# Patient Record
Sex: FEMALE | Race: BLACK OR AFRICAN AMERICAN | NOT HISPANIC OR LATINO
[De-identification: names, ages, dates, MRNs, and addresses within clinical notes are randomized per-mention and may not be internally consistent; named-entity substitution may affect disease eponyms.]

---

## 2019-09-19 ENCOUNTER — RESULT REVIEW (OUTPATIENT)
Age: 24
End: 2019-09-19

## 2019-11-25 ENCOUNTER — LABORATORY RESULT (OUTPATIENT)
Age: 24
End: 2019-11-25

## 2019-11-26 ENCOUNTER — APPOINTMENT (OUTPATIENT)
Dept: ANTEPARTUM | Facility: CLINIC | Age: 24
End: 2019-11-26
Payer: COMMERCIAL

## 2019-11-26 PROCEDURE — 76813 OB US NUCHAL MEAS 1 GEST: CPT

## 2019-11-26 PROCEDURE — 76801 OB US < 14 WKS SINGLE FETUS: CPT

## 2019-12-02 PROBLEM — Z00.00 ENCOUNTER FOR PREVENTIVE HEALTH EXAMINATION: Status: ACTIVE | Noted: 2019-12-02

## 2019-12-13 ENCOUNTER — APPOINTMENT (OUTPATIENT)
Dept: ANTEPARTUM | Facility: CLINIC | Age: 24
End: 2019-12-13

## 2020-01-02 ENCOUNTER — APPOINTMENT (OUTPATIENT)
Dept: ANTEPARTUM | Facility: CLINIC | Age: 25
End: 2020-01-02

## 2020-02-03 ENCOUNTER — APPOINTMENT (OUTPATIENT)
Dept: ANTEPARTUM | Facility: CLINIC | Age: 25
End: 2020-02-03
Payer: MEDICAID

## 2020-02-03 PROCEDURE — 76811 OB US DETAILED SNGL FETUS: CPT

## 2020-02-03 PROCEDURE — 76817 TRANSVAGINAL US OBSTETRIC: CPT

## 2020-02-10 ENCOUNTER — APPOINTMENT (OUTPATIENT)
Dept: ANTEPARTUM | Facility: CLINIC | Age: 25
End: 2020-02-10
Payer: MEDICAID

## 2020-02-10 PROCEDURE — 76816 OB US FOLLOW-UP PER FETUS: CPT

## 2020-02-10 PROCEDURE — 76817 TRANSVAGINAL US OBSTETRIC: CPT

## 2020-02-11 ENCOUNTER — APPOINTMENT (OUTPATIENT)
Dept: ANTEPARTUM | Facility: CLINIC | Age: 25
End: 2020-02-11

## 2020-03-02 ENCOUNTER — APPOINTMENT (OUTPATIENT)
Dept: ANTEPARTUM | Facility: CLINIC | Age: 25
End: 2020-03-02
Payer: MEDICAID

## 2020-03-02 PROCEDURE — 76819 FETAL BIOPHYS PROFIL W/O NST: CPT

## 2020-03-02 PROCEDURE — 76816 OB US FOLLOW-UP PER FETUS: CPT

## 2020-03-02 PROCEDURE — 76817 TRANSVAGINAL US OBSTETRIC: CPT

## 2020-03-30 ENCOUNTER — APPOINTMENT (OUTPATIENT)
Dept: ANTEPARTUM | Facility: CLINIC | Age: 25
End: 2020-03-30
Payer: MEDICAID

## 2020-03-30 PROCEDURE — 76817 TRANSVAGINAL US OBSTETRIC: CPT

## 2020-03-30 PROCEDURE — 76816 OB US FOLLOW-UP PER FETUS: CPT

## 2020-03-30 PROCEDURE — 76819 FETAL BIOPHYS PROFIL W/O NST: CPT

## 2020-04-09 ENCOUNTER — INPATIENT (INPATIENT)
Facility: HOSPITAL | Age: 25
LOS: 4 days | Discharge: HOME | End: 2020-04-14
Attending: SPECIALIST | Admitting: SPECIALIST
Payer: MEDICAID

## 2020-04-09 VITALS
SYSTOLIC BLOOD PRESSURE: 136 MMHG | OXYGEN SATURATION: 100 % | RESPIRATION RATE: 19 BRPM | TEMPERATURE: 98 F | DIASTOLIC BLOOD PRESSURE: 70 MMHG | HEART RATE: 149 BPM

## 2020-04-09 DIAGNOSIS — Z98.890 OTHER SPECIFIED POSTPROCEDURAL STATES: Chronic | ICD-10-CM

## 2020-04-09 DIAGNOSIS — R06.02 SHORTNESS OF BREATH: ICD-10-CM

## 2020-04-09 DIAGNOSIS — R09.02 HYPOXEMIA: ICD-10-CM

## 2020-04-09 LAB
ALBUMIN SERPL ELPH-MCNC: 3.5 G/DL — SIGNIFICANT CHANGE UP (ref 3.3–5)
ALP SERPL-CCNC: 125 U/L — HIGH (ref 40–120)
ALT FLD-CCNC: 49 U/L — HIGH (ref 4–33)
ANION GAP SERPL CALC-SCNC: 14 MMO/L — SIGNIFICANT CHANGE UP (ref 7–14)
AST SERPL-CCNC: 81 U/L — HIGH (ref 4–32)
BASOPHILS # BLD AUTO: 0.04 K/UL — SIGNIFICANT CHANGE UP (ref 0–0.2)
BASOPHILS NFR BLD AUTO: 0.5 % — SIGNIFICANT CHANGE UP (ref 0–2)
BILIRUB SERPL-MCNC: 0.4 MG/DL — SIGNIFICANT CHANGE UP (ref 0.2–1.2)
BUN SERPL-MCNC: 6 MG/DL — LOW (ref 7–23)
CALCIUM SERPL-MCNC: 8.4 MG/DL — SIGNIFICANT CHANGE UP (ref 8.4–10.5)
CHLORIDE SERPL-SCNC: 99 MMOL/L — SIGNIFICANT CHANGE UP (ref 98–107)
CO2 SERPL-SCNC: 19 MMOL/L — LOW (ref 22–31)
CREAT SERPL-MCNC: 0.64 MG/DL — SIGNIFICANT CHANGE UP (ref 0.5–1.3)
EOSINOPHIL # BLD AUTO: 0 K/UL — SIGNIFICANT CHANGE UP (ref 0–0.5)
EOSINOPHIL NFR BLD AUTO: 0 % — SIGNIFICANT CHANGE UP (ref 0–6)
GLUCOSE SERPL-MCNC: 83 MG/DL — SIGNIFICANT CHANGE UP (ref 70–99)
HCT VFR BLD CALC: 38.6 % — SIGNIFICANT CHANGE UP (ref 34.5–45)
HGB BLD-MCNC: 13 G/DL — SIGNIFICANT CHANGE UP (ref 11.5–15.5)
IMM GRANULOCYTES NFR BLD AUTO: 4 % — HIGH (ref 0–1.5)
LYMPHOCYTES # BLD AUTO: 1.32 K/UL — SIGNIFICANT CHANGE UP (ref 1–3.3)
LYMPHOCYTES # BLD AUTO: 15.5 % — SIGNIFICANT CHANGE UP (ref 13–44)
MCHC RBC-ENTMCNC: 31.3 PG — SIGNIFICANT CHANGE UP (ref 27–34)
MCHC RBC-ENTMCNC: 33.7 % — SIGNIFICANT CHANGE UP (ref 32–36)
MCV RBC AUTO: 93 FL — SIGNIFICANT CHANGE UP (ref 80–100)
MONOCYTES # BLD AUTO: 0.57 K/UL — SIGNIFICANT CHANGE UP (ref 0–0.9)
MONOCYTES NFR BLD AUTO: 6.7 % — SIGNIFICANT CHANGE UP (ref 2–14)
NEUTROPHILS # BLD AUTO: 6.22 K/UL — SIGNIFICANT CHANGE UP (ref 1.8–7.4)
NEUTROPHILS NFR BLD AUTO: 73.3 % — SIGNIFICANT CHANGE UP (ref 43–77)
NRBC # FLD: 0 K/UL — SIGNIFICANT CHANGE UP (ref 0–0)
PLATELET # BLD AUTO: 119 K/UL — LOW (ref 150–400)
PMV BLD: 11.2 FL — SIGNIFICANT CHANGE UP (ref 7–13)
POTASSIUM SERPL-MCNC: 4 MMOL/L — SIGNIFICANT CHANGE UP (ref 3.5–5.3)
POTASSIUM SERPL-SCNC: 4 MMOL/L — SIGNIFICANT CHANGE UP (ref 3.5–5.3)
PROT SERPL-MCNC: 6.4 G/DL — SIGNIFICANT CHANGE UP (ref 6–8.3)
RBC # BLD: 4.15 M/UL — SIGNIFICANT CHANGE UP (ref 3.8–5.2)
RBC # FLD: 12.5 % — SIGNIFICANT CHANGE UP (ref 10.3–14.5)
SODIUM SERPL-SCNC: 132 MMOL/L — LOW (ref 135–145)
TROPONIN T, HIGH SENSITIVITY: < 6 NG/L — SIGNIFICANT CHANGE UP (ref ?–14)
TSH SERPL-MCNC: 0.66 UIU/ML — SIGNIFICANT CHANGE UP (ref 0.27–4.2)
WBC # BLD: 8.49 K/UL — SIGNIFICANT CHANGE UP (ref 3.8–10.5)
WBC # FLD AUTO: 8.49 K/UL — SIGNIFICANT CHANGE UP (ref 3.8–10.5)

## 2020-04-09 PROCEDURE — 71275 CT ANGIOGRAPHY CHEST: CPT | Mod: 26

## 2020-04-09 RX ORDER — HEPARIN SODIUM 5000 [USP'U]/ML
5000 INJECTION INTRAVENOUS; SUBCUTANEOUS EVERY 12 HOURS
Refills: 0 | Status: DISCONTINUED | OUTPATIENT
Start: 2020-04-09 | End: 2020-04-14

## 2020-04-09 RX ORDER — PROGESTERONE 200 MG/1
200 CAPSULE, LIQUID FILLED ORAL DAILY
Refills: 0 | Status: DISCONTINUED | OUTPATIENT
Start: 2020-04-09 | End: 2020-04-14

## 2020-04-09 RX ORDER — FOLIC ACID 0.8 MG
1 TABLET ORAL DAILY
Refills: 0 | Status: DISCONTINUED | OUTPATIENT
Start: 2020-04-09 | End: 2020-04-10

## 2020-04-09 RX ORDER — SODIUM CHLORIDE 9 MG/ML
1000 INJECTION INTRAMUSCULAR; INTRAVENOUS; SUBCUTANEOUS ONCE
Refills: 0 | Status: COMPLETED | OUTPATIENT
Start: 2020-04-09 | End: 2020-04-09

## 2020-04-09 RX ORDER — ACETAMINOPHEN 500 MG
650 TABLET ORAL ONCE
Refills: 0 | Status: COMPLETED | OUTPATIENT
Start: 2020-04-09 | End: 2020-04-09

## 2020-04-09 RX ADMIN — SODIUM CHLORIDE 1000 MILLILITER(S): 9 INJECTION INTRAMUSCULAR; INTRAVENOUS; SUBCUTANEOUS at 16:55

## 2020-04-09 RX ADMIN — Medication 650 MILLIGRAM(S): at 14:39

## 2020-04-09 NOTE — OB PROVIDER H&P - NSHPLABSRESULTS_GEN_ALL_CORE
Vital Signs Last 24 Hrs  T(C): 36.6 (09 Apr 2020 18:50), Max: 36.9 (09 Apr 2020 13:05)  T(F): 97.8 (09 Apr 2020 18:50), Max: 98.5 (09 Apr 2020 13:05)  HR: 125 (09 Apr 2020 19:49) (115 - 149)  BP: 112/69 (09 Apr 2020 18:50) (112/69 - 136/70)  BP(mean): --  RR: 20 (09 Apr 2020 18:50) (19 - 20)  SpO2: 82% (09 Apr 2020 19:49) (82% - 100%)    I&O's Detail                            13.0   8.49  )-----------( 119      ( 09 Apr 2020 13:52 )             38.6       04-09    132<L>  |  99  |  6<L>  ----------------------------<  83  4.0   |  19<L>  |  0.64    Ca    8.4      09 Apr 2020 13:52    TPro  6.4  /  Alb  3.5  /  TBili  0.4  /  DBili  x   /  AST  81<H>  /  ALT  49<H>  /  AlkPhos  125<H>  04-09      LIVER FUNCTIONS - ( 09 Apr 2020 13:52 )  Alb: 3.5 g/dL / Pro: 6.4 g/dL / ALK PHOS: 125 u/L / ALT: 49 u/L / AST: 81 u/L / GGT: x      EXAM:  CT ANGIO CHEST (W)AW         PROCEDURE DATE:  Apr 9 2020         INTERPRETATION:  CLINICAL INFORMATION: Chest pain with tachycardia at 30 weeks pregnant.    COMPARISON: None.    PROCEDURE:   CT Angiography of the Chest.  90 ml of Omnipaque 350 was injected intravenously. 10 ml were discarded.  Sagittal and coronal reformats were performed as well as 3D (MIP) reconstructions.      FINDINGS:    LUNGS AND AIRWAYS: The airways are normal. Right lower lobe nodular opacities. In addition, there are patchy opacities in the left lower lobe (series 3 image 74)    PLEURA: No pleural effusion. No pneumothorax.    MEDIASTINUM AND NOE: No lymphadenopathy. The visualized thyroid gland and esophagus are unremarkable.    VESSELS: The pulmonary artery is normal in caliber. No main, central, lobar, or segmental pulmonary embolus. The subsegmental vessels are not well evaluated.    HEART: Heart size is normal. No pericardial effusion.    CHEST WALL AND LOWER NECK: Within normal limits.    VISUALIZED UPPER ABDOMEN: Within normal limits.    BONES: Within normal limits.    IMPRESSION:     1.  No pulmonary emboli.  2.  The patchy foci of consolidation can occur in the setting of infection, including atypical causes of infection (Covid 19) given the current clinical environment.

## 2020-04-09 NOTE — OB PROVIDER H&P - NSHPPHYSICALEXAM_GEN_ALL_CORE
Physical Exam:   General: sitting comfortably in bed, NAD   Neck: supple, full ROM, no lymphadenopathy  CV: Tachycardic, regular rhythm   Lungs: CTA b/l, good air flow b/l   Back: No CVA tenderness  Abd: Gravid, soft, NTND, no rebound or guarding   Pelvic: deferred   Ext: NT b/l, no edema

## 2020-04-09 NOTE — ED PROVIDER NOTE - ATTENDING CONTRIBUTION TO CARE
Pt was seen and evaluated by me. Pt is a 23 y/o female G1PO at 30 wks with PMHx of Asthma who presented to the ED for chest discomfort X 3 days. Pt states over the past 3 days having chest discomfort with deep inspiration. Pt denies any fever, chills, nausea, vomiting, or abd pain. Pt denies any vaginal bleeding or discharge. Lungs CTA b/l. Tachy. Gravid.   Concern for Dehydration/Anemia/PE/Arrhythmia   Labs, EKG, CT

## 2020-04-09 NOTE — PROVIDER CONTACT NOTE (OTHER) - ACTION/TREATMENT ORDERED:
Pt placed of portable EFM for NST. NST reactive.  bpm, moderate varibility with 15x15 accels. No decelerations noted. Contraction x1, pt denies pain/discomfort. Pt approved to be d/c'ed.

## 2020-04-09 NOTE — OB PROVIDER H&P - PROBLEM SELECTOR PLAN 1
- Admit to OBGYN   - F/u COVID   - Appreciate Medicine following   - NST reactive  - No acute obstetric concerns or interventions at this time  - Continue PNV and vaginal progesterone for incompetent cervix  - NST PRN   - AM labs     Lola Avelar PGY2  d/w Dr. Wang

## 2020-04-09 NOTE — OB PROVIDER H&P - NSHPREVIEWOFSYSTEMS_GEN_ALL_CORE
GENERAL: no fever, chills  HEENT: no throat pain, cough, congestion, dysphagia  CARDIAC: +chest pain,   PULM: +shortness of breath, no cough, wheezing   GI: no abdominal pain, nausea, vomiting, diarrhea, constipation  : no dysuria, frequency  NEURO: no headache, lightheadedness  MSK: no joint or muscle pain  SKIN: no rashes  HEME: no active bleeding

## 2020-04-09 NOTE — ED ADULT TRIAGE NOTE - CHIEF COMPLAINT QUOTE
pt 30 weeks pregnant c/o chest pain and headache. tachycardic in triage. denies any fevers or chills. spoke with d&t delvis pt to be seen in ed. no pmh

## 2020-04-09 NOTE — OB PROVIDER H&P - ASSESSMENT
25 yo  at 30+2 wks gestation (LISSETT 20) presents with SOB, tachycardia to 110s, O2 sats wnl in RA at rest, but desaturation noted to 80s on ambulation in the ED. Patient tachycardic to 110s-120s, afebrile. CTA neg for PE and sig for patchy foci of consolidation concerning for COVID. No acute obstetric complaints. AST/ALT 81/49. HA improved s/p Tylenol. BPs wnl. No concern for PEC at this time.

## 2020-04-09 NOTE — ED PROVIDER NOTE - PROGRESS NOTE DETAILS
Dr. Trujillo: Contacted [facility] who will send down a nurse for the NST. Dr. Trujillo: Explained risks and benefits for CT-A given pregnancy and concern for PE. Answered questions and pt consented to CT-A. Afshin STAPLETON: Pt with CT showing likely COVID infection. No PE. Pt persistently tachycardic to the 110s-120s at rest. O2 sat 100% on RA while sitting HOWEVER pt desats to the mid to low 80s while ambulating. Discussed the case with OB who recommended medicine admission with obgyn comanagement. paged the hospitalist to discuss. Afshin STAPLETON: d/w obgyn resident who saw the patient. will admit under dr. keenan castro to ob service with medicine comanagement

## 2020-04-09 NOTE — ED ADULT NURSE REASSESSMENT NOTE - NS ED NURSE REASSESS COMMENT FT1
pt ambulatory to bathroom, denies cp sob fever chills, pt given dinner safety maintained will monitor,

## 2020-04-09 NOTE — ED PROVIDER NOTE - CLINICAL SUMMARY MEDICAL DECISION MAKING FREE TEXT BOX
34F presents 30wks pregnant with pleuritic type cp, sinus tach on ekg. BS clear throughout b/l lung fields. No respiratory distress, fever, or cough. Concern for PE in setting of pregnancy, plan labs, will discuss risk/benefit of cta chest.

## 2020-04-09 NOTE — OB PROVIDER H&P - HISTORY OF PRESENT ILLNESS
25 yo  at 30+2 wks gestation (LISSETT 20) presents with worsening SOB, and sharp chest CP x3 days. SOB worse when laying flat and on ambulation and climbing stairs. She reports her step mom has had similar symptoms, but unk COVID status. She denies cough, fevers, chills. She reports nausea, but no vomiting, Tolerating PO. Reports mild HA earlier, improved with Tylenol in the ED. Denies associated changes in vision, lightheadedness, dizziness, epigastric pain, RUQ pain, diarrhea, constipation, dysuria, frequency, hematuria. Prenatal care c/b short cervix on vaginal progesterone 200 daily. She currently denies contractions, loss of fluids, vaginal bleeding and endorses +fetal movement.     OB Provider: Dr. Elena Matthew ([facility])     OBhx - TOPx1 s/p D&C (11 yrs ago)   GYNHx - small fibroids  PMHx - Asthma, well controlled (last Albuterol use in , no hospitalizations, intubations)   PSHx - D&C x1, elbow surgery   Meds - PNV, Vag progesterone 200 QD  Allergies - ASA (rxn: SOB, asthma exacerbation)   Social - denies tobacco, alcohol, recreational drug use  Psych - anxiety (no meds)

## 2020-04-10 ENCOUNTER — TRANSCRIPTION ENCOUNTER (OUTPATIENT)
Age: 25
End: 2020-04-10

## 2020-04-10 LAB
ALBUMIN SERPL ELPH-MCNC: 3 G/DL — LOW (ref 3.3–5)
ALBUMIN SERPL ELPH-MCNC: 3.3 G/DL — SIGNIFICANT CHANGE UP (ref 3.3–5)
ALBUMIN SERPL ELPH-MCNC: 3.4 G/DL — SIGNIFICANT CHANGE UP (ref 3.3–5)
ALP SERPL-CCNC: 117 U/L — SIGNIFICANT CHANGE UP (ref 40–120)
ALP SERPL-CCNC: 120 U/L — SIGNIFICANT CHANGE UP (ref 40–120)
ALP SERPL-CCNC: 120 U/L — SIGNIFICANT CHANGE UP (ref 40–120)
ALT FLD-CCNC: 46 U/L — HIGH (ref 4–33)
ALT FLD-CCNC: 47 U/L — HIGH (ref 4–33)
ALT FLD-CCNC: 49 U/L — HIGH (ref 4–33)
ANION GAP SERPL CALC-SCNC: 12 MMO/L — SIGNIFICANT CHANGE UP (ref 7–14)
ANION GAP SERPL CALC-SCNC: 12 MMO/L — SIGNIFICANT CHANGE UP (ref 7–14)
ANION GAP SERPL CALC-SCNC: 14 MMO/L — SIGNIFICANT CHANGE UP (ref 7–14)
ANISOCYTOSIS BLD QL: SLIGHT — SIGNIFICANT CHANGE UP
APTT BLD: 30 SEC — SIGNIFICANT CHANGE UP (ref 27.5–36.3)
APTT BLD: 33.8 SEC — SIGNIFICANT CHANGE UP (ref 27.5–36.3)
AST SERPL-CCNC: 72 U/L — HIGH (ref 4–32)
AST SERPL-CCNC: 75 U/L — HIGH (ref 4–32)
AST SERPL-CCNC: 77 U/L — HIGH (ref 4–32)
BASOPHILS # BLD AUTO: 0.05 K/UL — SIGNIFICANT CHANGE UP (ref 0–0.2)
BASOPHILS # BLD AUTO: 0.06 K/UL — SIGNIFICANT CHANGE UP (ref 0–0.2)
BASOPHILS NFR BLD AUTO: 0.6 % — SIGNIFICANT CHANGE UP (ref 0–2)
BASOPHILS NFR BLD AUTO: 0.8 % — SIGNIFICANT CHANGE UP (ref 0–2)
BASOPHILS NFR SPEC: 0 % — SIGNIFICANT CHANGE UP (ref 0–2)
BILIRUB SERPL-MCNC: 0.4 MG/DL — SIGNIFICANT CHANGE UP (ref 0.2–1.2)
BILIRUB SERPL-MCNC: 0.4 MG/DL — SIGNIFICANT CHANGE UP (ref 0.2–1.2)
BILIRUB SERPL-MCNC: 0.5 MG/DL — SIGNIFICANT CHANGE UP (ref 0.2–1.2)
BLASTS # FLD: 0 % — SIGNIFICANT CHANGE UP (ref 0–0)
BLD GP AB SCN SERPL QL: NEGATIVE — SIGNIFICANT CHANGE UP
BUN SERPL-MCNC: 5 MG/DL — LOW (ref 7–23)
BUN SERPL-MCNC: 5 MG/DL — LOW (ref 7–23)
BUN SERPL-MCNC: 6 MG/DL — LOW (ref 7–23)
CALCIUM SERPL-MCNC: 8.1 MG/DL — LOW (ref 8.4–10.5)
CALCIUM SERPL-MCNC: 8.2 MG/DL — LOW (ref 8.4–10.5)
CALCIUM SERPL-MCNC: 8.4 MG/DL — SIGNIFICANT CHANGE UP (ref 8.4–10.5)
CHLORIDE SERPL-SCNC: 101 MMOL/L — SIGNIFICANT CHANGE UP (ref 98–107)
CHLORIDE SERPL-SCNC: 102 MMOL/L — SIGNIFICANT CHANGE UP (ref 98–107)
CHLORIDE SERPL-SCNC: 99 MMOL/L — SIGNIFICANT CHANGE UP (ref 98–107)
CO2 SERPL-SCNC: 18 MMOL/L — LOW (ref 22–31)
CO2 SERPL-SCNC: 19 MMOL/L — LOW (ref 22–31)
CO2 SERPL-SCNC: 21 MMOL/L — LOW (ref 22–31)
CREAT ?TM UR-MCNC: 128.7 MG/DL — SIGNIFICANT CHANGE UP
CREAT SERPL-MCNC: 0.44 MG/DL — LOW (ref 0.5–1.3)
CREAT SERPL-MCNC: 0.48 MG/DL — LOW (ref 0.5–1.3)
CREAT SERPL-MCNC: 0.58 MG/DL — SIGNIFICANT CHANGE UP (ref 0.5–1.3)
CRP SERPL-MCNC: 23.9 MG/L — HIGH
EOSINOPHIL # BLD AUTO: 0.02 K/UL — SIGNIFICANT CHANGE UP (ref 0–0.5)
EOSINOPHIL # BLD AUTO: 0.03 K/UL — SIGNIFICANT CHANGE UP (ref 0–0.5)
EOSINOPHIL NFR BLD AUTO: 0.3 % — SIGNIFICANT CHANGE UP (ref 0–6)
EOSINOPHIL NFR BLD AUTO: 0.4 % — SIGNIFICANT CHANGE UP (ref 0–6)
EOSINOPHIL NFR FLD: 0 % — SIGNIFICANT CHANGE UP (ref 0–6)
FERRITIN SERPL-MCNC: 24.13 NG/ML — SIGNIFICANT CHANGE UP (ref 15–150)
FIBRINOGEN PPP-MCNC: 483 MG/DL — SIGNIFICANT CHANGE UP (ref 300–520)
FIBRINOGEN PPP-MCNC: 515 MG/DL — SIGNIFICANT CHANGE UP (ref 300–520)
GLUCOSE SERPL-MCNC: 103 MG/DL — HIGH (ref 70–99)
GLUCOSE SERPL-MCNC: 107 MG/DL — HIGH (ref 70–99)
GLUCOSE SERPL-MCNC: 89 MG/DL — SIGNIFICANT CHANGE UP (ref 70–99)
HCT VFR BLD CALC: 35.9 % — SIGNIFICANT CHANGE UP (ref 34.5–45)
HCT VFR BLD CALC: 36.5 % — SIGNIFICANT CHANGE UP (ref 34.5–45)
HGB BLD-MCNC: 12.2 G/DL — SIGNIFICANT CHANGE UP (ref 11.5–15.5)
HGB BLD-MCNC: 12.3 G/DL — SIGNIFICANT CHANGE UP (ref 11.5–15.5)
HYPOCHROMIA BLD QL: SLIGHT — SIGNIFICANT CHANGE UP
IMM GRANULOCYTES NFR BLD AUTO: 4.8 % — HIGH (ref 0–1.5)
IMM GRANULOCYTES NFR BLD AUTO: 5.8 % — HIGH (ref 0–1.5)
INR BLD: 0.97 — SIGNIFICANT CHANGE UP (ref 0.88–1.17)
INR BLD: 0.97 — SIGNIFICANT CHANGE UP (ref 0.88–1.17)
LDH SERPL L TO P-CCNC: 234 U/L — HIGH (ref 135–225)
LDH SERPL L TO P-CCNC: 239 U/L — HIGH (ref 135–225)
LDH SERPL L TO P-CCNC: 254 U/L — HIGH (ref 135–225)
LYMPHOCYTES # BLD AUTO: 1.06 K/UL — SIGNIFICANT CHANGE UP (ref 1–3.3)
LYMPHOCYTES # BLD AUTO: 1.22 K/UL — SIGNIFICANT CHANGE UP (ref 1–3.3)
LYMPHOCYTES # BLD AUTO: 14.4 % — SIGNIFICANT CHANGE UP (ref 13–44)
LYMPHOCYTES # BLD AUTO: 15.8 % — SIGNIFICANT CHANGE UP (ref 13–44)
LYMPHOCYTES NFR SPEC AUTO: 6.1 % — LOW (ref 13–44)
MACROCYTES BLD QL: SIGNIFICANT CHANGE UP
MCHC RBC-ENTMCNC: 31.9 PG — SIGNIFICANT CHANGE UP (ref 27–34)
MCHC RBC-ENTMCNC: 32.1 PG — SIGNIFICANT CHANGE UP (ref 27–34)
MCHC RBC-ENTMCNC: 33.7 % — SIGNIFICANT CHANGE UP (ref 32–36)
MCHC RBC-ENTMCNC: 34 % — SIGNIFICANT CHANGE UP (ref 32–36)
MCV RBC AUTO: 94.5 FL — SIGNIFICANT CHANGE UP (ref 80–100)
MCV RBC AUTO: 94.6 FL — SIGNIFICANT CHANGE UP (ref 80–100)
METAMYELOCYTES # FLD: 2.6 % — HIGH (ref 0–1)
MONOCYTES # BLD AUTO: 0.54 K/UL — SIGNIFICANT CHANGE UP (ref 0–0.9)
MONOCYTES # BLD AUTO: 0.61 K/UL — SIGNIFICANT CHANGE UP (ref 0–0.9)
MONOCYTES NFR BLD AUTO: 7 % — SIGNIFICANT CHANGE UP (ref 2–14)
MONOCYTES NFR BLD AUTO: 8.3 % — SIGNIFICANT CHANGE UP (ref 2–14)
MONOCYTES NFR BLD: 6.1 % — SIGNIFICANT CHANGE UP (ref 2–9)
MYELOCYTES NFR BLD: 1.7 % — HIGH (ref 0–0)
NEUTROPHIL AB SER-ACNC: 75.7 % — SIGNIFICANT CHANGE UP (ref 43–77)
NEUTROPHILS # BLD AUTO: 5.18 K/UL — SIGNIFICANT CHANGE UP (ref 1.8–7.4)
NEUTROPHILS # BLD AUTO: 5.52 K/UL — SIGNIFICANT CHANGE UP (ref 1.8–7.4)
NEUTROPHILS NFR BLD AUTO: 70.3 % — SIGNIFICANT CHANGE UP (ref 43–77)
NEUTROPHILS NFR BLD AUTO: 71.5 % — SIGNIFICANT CHANGE UP (ref 43–77)
NEUTS BAND # BLD: 1.7 % — SIGNIFICANT CHANGE UP (ref 0–6)
NRBC # FLD: 0 K/UL — SIGNIFICANT CHANGE UP (ref 0–0)
NRBC # FLD: 0 K/UL — SIGNIFICANT CHANGE UP (ref 0–0)
OTHER - HEMATOLOGY %: 0 — SIGNIFICANT CHANGE UP
PLATELET # BLD AUTO: 111 K/UL — LOW (ref 150–400)
PLATELET # BLD AUTO: 112 K/UL — LOW (ref 150–400)
PLATELET COUNT - ESTIMATE: SIGNIFICANT CHANGE UP
PMV BLD: 11.4 FL — SIGNIFICANT CHANGE UP (ref 7–13)
PMV BLD: 11.8 FL — SIGNIFICANT CHANGE UP (ref 7–13)
POLYCHROMASIA BLD QL SMEAR: SLIGHT — SIGNIFICANT CHANGE UP
POTASSIUM SERPL-MCNC: 3.7 MMOL/L — SIGNIFICANT CHANGE UP (ref 3.5–5.3)
POTASSIUM SERPL-MCNC: 3.7 MMOL/L — SIGNIFICANT CHANGE UP (ref 3.5–5.3)
POTASSIUM SERPL-MCNC: 4 MMOL/L — SIGNIFICANT CHANGE UP (ref 3.5–5.3)
POTASSIUM SERPL-SCNC: 3.7 MMOL/L — SIGNIFICANT CHANGE UP (ref 3.5–5.3)
POTASSIUM SERPL-SCNC: 3.7 MMOL/L — SIGNIFICANT CHANGE UP (ref 3.5–5.3)
POTASSIUM SERPL-SCNC: 4 MMOL/L — SIGNIFICANT CHANGE UP (ref 3.5–5.3)
PROCALCITONIN SERPL-MCNC: 0.18 NG/ML — HIGH (ref 0.02–0.1)
PROMYELOCYTES # FLD: 0 % — SIGNIFICANT CHANGE UP (ref 0–0)
PROT SERPL-MCNC: 5.8 G/DL — LOW (ref 6–8.3)
PROT SERPL-MCNC: 6.2 G/DL — SIGNIFICANT CHANGE UP (ref 6–8.3)
PROT SERPL-MCNC: 6.2 G/DL — SIGNIFICANT CHANGE UP (ref 6–8.3)
PROT UR-MCNC: 33.4 MG/DL — SIGNIFICANT CHANGE UP
PROTHROM AB SERPL-ACNC: 11 SEC — SIGNIFICANT CHANGE UP (ref 9.8–13.1)
PROTHROM AB SERPL-ACNC: 11.2 SEC — SIGNIFICANT CHANGE UP (ref 9.8–13.1)
RBC # BLD: 3.8 M/UL — SIGNIFICANT CHANGE UP (ref 3.8–5.2)
RBC # BLD: 3.86 M/UL — SIGNIFICANT CHANGE UP (ref 3.8–5.2)
RBC # FLD: 12.4 % — SIGNIFICANT CHANGE UP (ref 10.3–14.5)
RBC # FLD: 12.7 % — SIGNIFICANT CHANGE UP (ref 10.3–14.5)
RH IG SCN BLD-IMP: POSITIVE — SIGNIFICANT CHANGE UP
RH IG SCN BLD-IMP: POSITIVE — SIGNIFICANT CHANGE UP
SODIUM SERPL-SCNC: 131 MMOL/L — LOW (ref 135–145)
SODIUM SERPL-SCNC: 132 MMOL/L — LOW (ref 135–145)
SODIUM SERPL-SCNC: 135 MMOL/L — SIGNIFICANT CHANGE UP (ref 135–145)
URATE SERPL-MCNC: 2.4 MG/DL — LOW (ref 2.5–7)
URATE SERPL-MCNC: 2.4 MG/DL — LOW (ref 2.5–7)
URATE SERPL-MCNC: 2.6 MG/DL — SIGNIFICANT CHANGE UP (ref 2.5–7)
VARIANT LYMPHS # BLD: 6.1 % — SIGNIFICANT CHANGE UP
WBC # BLD: 7.37 K/UL — SIGNIFICANT CHANGE UP (ref 3.8–10.5)
WBC # BLD: 7.72 K/UL — SIGNIFICANT CHANGE UP (ref 3.8–10.5)
WBC # FLD AUTO: 7.37 K/UL — SIGNIFICANT CHANGE UP (ref 3.8–10.5)
WBC # FLD AUTO: 7.72 K/UL — SIGNIFICANT CHANGE UP (ref 3.8–10.5)

## 2020-04-10 PROCEDURE — 99222 1ST HOSP IP/OBS MODERATE 55: CPT

## 2020-04-10 PROCEDURE — 99233 SBSQ HOSP IP/OBS HIGH 50: CPT

## 2020-04-10 RX ORDER — PROGESTERONE 200 MG/1
0 CAPSULE, LIQUID FILLED ORAL
Qty: 0 | Refills: 0 | DISCHARGE
Start: 2020-04-10

## 2020-04-10 RX ORDER — FOLIC ACID 0.8 MG
1 TABLET ORAL DAILY
Refills: 0 | Status: DISCONTINUED | OUTPATIENT
Start: 2020-04-10 | End: 2020-04-14

## 2020-04-10 RX ORDER — ACETAMINOPHEN 500 MG
975 TABLET ORAL EVERY 6 HOURS
Refills: 0 | Status: DISCONTINUED | OUTPATIENT
Start: 2020-04-10 | End: 2020-04-14

## 2020-04-10 RX ORDER — FOLIC ACID 0.8 MG
1 TABLET ORAL
Qty: 0 | Refills: 0 | DISCHARGE
Start: 2020-04-10

## 2020-04-10 RX ADMIN — Medication 975 MILLIGRAM(S): at 02:25

## 2020-04-10 RX ADMIN — HEPARIN SODIUM 5000 UNIT(S): 5000 INJECTION INTRAVENOUS; SUBCUTANEOUS at 18:22

## 2020-04-10 RX ADMIN — Medication 975 MILLIGRAM(S): at 15:26

## 2020-04-10 RX ADMIN — PROGESTERONE 200 MILLIGRAM(S): 200 CAPSULE, LIQUID FILLED ORAL at 21:16

## 2020-04-10 NOTE — PROGRESS NOTE ADULT - ASSESSMENT
A/P 23 yo  at 30w3d admitted for observation in the setting of suspected COVID-19 pneumonia, stable  - patient saturating well on room air, CTA ruled out PE, ID suggesting no therapy or abx at this time  - PO hydration encouraged, EKG sinus tach w/ nonspecific T wave changes  - labs reviewed and notable for thrombocytopenia and transaminitis, patient appears to have had elevated /57 since admission, low suspicion for preeclampsia at this time given no toxic symptoms and these lab abnormalities commonly present in COVID-19 pneumonia, will send p:cr ratio and monitor closely  - fetal status reassuring overall  - H for DVT prophylaxis A/P 25 yo  at 30w3d admitted for observation in the setting of suspected COVID-19 pneumonia, stable  - patient saturating well on room air, CTA ruled out PE, ID suggesting no therapy or abx at this time  - PO hydration encouraged, EKG sinus tach w/ nonspecific T wave changes  - labs reviewed and notable for thrombocytopenia and transaminitis, patient appears to have had elevated /57 since admission, low suspicion for preeclampsia at this time given no toxic symptoms and these lab abnormalities commonly present in COVID-19 pneumonia, will send p:cr ratio and monitor closely  - fetal status reassuring overall  - H for DVT prophylaxis

## 2020-04-10 NOTE — DISCHARGE NOTE ANTEPARTUM - CARE PLAN
Principal Discharge DX:	COVID-19  Goal:	Recovery  Assessment and plan of treatment:	You have been diagnosed with the COVID-19 virus. You must self quarantine to complete a 14 day time period from the date of your diagnosis.  Monitor for fevers, shortness of breath and cough primarily.  Monitor your temperature daily to not any changes and increases.   It has been determined that you no longer need hospitalization and can recover while remaining in self-quarantine at home. You should follow the prevention steps below until a healthcare provider or local or state health department says you can return to your normal activities.  1. You should restrict activities outside your home, except for getting medical care.  2. Do not go to work, school, or public areas.  3. Avoid using public transportation, ride-sharing, or taxis.  4. Separate yourself from other people and animals in your home.  5. Call ahead before visiting your doctor.  6. Wear a facemask.  7. Cover your coughs and sneezes.  8. Clean your hands often.  9. Avoid sharing personal household items.    10. Clean all “high-touch” surfaces everyday.11. Monitor your symptoms.  If you have a medical emergency and need to call 911, notify the dispatch personnel that you have COVID-19 If possible, put on a facemask before emergency medical services arrive.  12. Stopping home isolation.  Patients with confirmed COVID-19 should remain under home isolation precautions for 14 days since the positive COVID-19 test and until the risk of secondary transmission to others is thought to be low. The decision to discontinue home isolation precautions should be made on a case-by-case basis, in consultation with healthcare providers and state and local health departments. Your [facility] Department of Health can be reached at [phone] for further information about COVID-19. Principal Discharge DX:	COVID-19  Goal:	Recovery  Assessment and plan of treatment:	You must self quarantine to complete a 14 day time period from the date of your diagnosis.  Monitor for fevers, shortness of breath and cough primarily.  Monitor your temperature daily to not any changes and increases.   It has been determined that you no longer need hospitalization and can recover while remaining in self-quarantine at home. You should follow the prevention steps below until a healthcare provider or local or state health department says you can return to your normal activities.  1. You should restrict activities outside your home, except for getting medical care.  2. Do not go to work, school, or public areas.  3. Avoid using public transportation, ride-sharing, or taxis.  4. Separate yourself from other people and animals in your home.  5. Call ahead before visiting your doctor.  6. Wear a facemask.  7. Cover your coughs and sneezes.  8. Clean your hands often.  9. Avoid sharing personal household items.    10. Clean all “high-touch” surfaces everyday.11. Monitor your symptoms.  If you have a medical emergency and need to call 911, notify the dispatch personnel that you have COVID-19 If possible, put on a facemask before emergency medical services arrive.  12. Stopping home isolation.  Patients with confirmed COVID-19 should remain under home isolation precautions for 14 days since the positive COVID-19 test and until the risk of secondary transmission to others is thought to be low. The decision to discontinue home isolation precautions should be made on a case-by-case basis, in consultation with healthcare providers and state and local health departments. Your [facility] Department of Health can be reached at [phone] for further information about COVID-19. Principal Discharge DX:	COVID-19  Goal:	Recovery  Assessment and plan of treatment:	-Continue and finish all antibiotics as prescribed  You must self quarantine to complete a 14 day time period from the date of your diagnosis.  Monitor for fevers, shortness of breath and cough primarily.  Monitor your temperature daily to not any changes and increases.   It has been determined that you no longer need hospitalization and can recover while remaining in self-quarantine at home. You should follow the prevention steps below until a healthcare provider or local or state health department says you can return to your normal activities.  1. You should restrict activities outside your home, except for getting medical care.  2. Do not go to work, school, or public areas.  3. Avoid using public transportation, ride-sharing, or taxis.  4. Separate yourself from other people and animals in your home.  5. Call ahead before visiting your doctor.  6. Wear a facemask.  7. Cover your coughs and sneezes.  8. Clean your hands often.  9. Avoid sharing personal household items.    10. Clean all “high-touch” surfaces everyday.11. Monitor your symptoms.  If you have a medical emergency and need to call 911, notify the dispatch personnel that you have COVID-19 If possible, put on a facemask before emergency medical services arrive.  12. Stopping home isolation.  Patients with confirmed COVID-19 should remain under home isolation precautions for 14 days since the positive COVID-19 test and until the risk of secondary transmission to others is thought to be low. The decision to discontinue home isolation precautions should be made on a case-by-case basis, in consultation with healthcare providers and state and local health departments. Your [facility] Department of Health can be reached at [phone] for further information about COVID-19.

## 2020-04-10 NOTE — CONSULT NOTE ADULT - ASSESSMENT
25 yo  at 30+2 wks gestation (LISSETT 20) presents with worsening SOB, and sharp chest CP x3 days  Fever, no leukocytosis  CT chest with vague mild ground glass opacities--I reviewed personally  Elevated LFTs  Generally mild illness, but in pregnant patient  Overall,  1) Suspected COVID  - F/U COVID PCR, if negative would be suspicious for false neg and plan on repeat  - O2 supplementation per primary team  - Hold on plaquenil/covid directed medications presently (patient saturating well on RA)  2) Fever  - Monitor for further episodes fever/potential sources of infection    Jimmy Martin MD  Pager [phone]  After 5pm and on weekends call [phone]

## 2020-04-10 NOTE — CHART NOTE - NSCHARTNOTEFT_GEN_A_CORE
R3 Chart Note    Plan to transfer patient to L&D for closer fetal monitoring if needed.    Discussed patient with ID. Per ID, pt does not require plaquenil at this time. Will d/w ID team during the day for any additional recommendations.    Pt noted to have mild range bp 140s/50s.  HELLP labs obtained    Vital Signs Last 24 Hrs  T(C): 39 (10 Apr 2020 00:00), Max: 39 (10 Apr 2020 00:00)  T(F): 102.2 (10 Apr 2020 00:00), Max: 102.2 (10 Apr 2020 00:00)  HR: 130 (10 Apr 2020 00:00) (115 - 149)  BP: 144/57 (10 Apr 2020 00:00) (112/69 - 144/57)  BP(mean): --  RR: 20 (10 Apr 2020 00:00) (19 - 24)  SpO2: 100% (10 Apr 2020 00:00) (82% - 100%)    I&O's Detail                            12.3   7.72  )-----------( 111      ( 10 Apr 2020 01:29 )             36.5       04-10    131<L>  |  99  |  5<L>  ----------------------------<  107<H>  3.7   |  18<L>  |  0.48<L>    Ca    8.4      10 Apr 2020 01:29    TPro  6.2  /  Alb  3.3  /  TBili  0.4  /  DBili  x   /  AST  77<H>  /  ALT  49<H>  /  AlkPhos  120  04-10    LIVER FUNCTIONS - ( 10 Apr 2020 01:29 )  Alb: 3.3 g/dL / Pro: 6.2 g/dL / ALK PHOS: 120 u/L / ALT: 49 u/L / AST: 77 u/L / GGT: x             PT/INR - ( 10 Apr 2020 01:29 )   PT: 11.0 SEC;   INR: 0.97          PTT - ( 10 Apr 2020 01:29 )  PTT:33.8 SEC    Plan  -F/u UA, P/C ratio  -mild transaminitis may be due to possible COVID infection  -Repeat HELLP labs in AM along with COVID labs: ferritin, procalcitonin, CRP, d-dimer, ferritin, T&S in AM  -Tylenol PRN fever.  -If pt febrile again, obtain blood cultures    D/w Dr. Felipe Santos PGY3

## 2020-04-10 NOTE — DISCHARGE NOTE ANTEPARTUM - PLAN OF CARE
Recovery You have been diagnosed with the COVID-19 virus. You must self quarantine to complete a 14 day time period from the date of your diagnosis.  Monitor for fevers, shortness of breath and cough primarily.  Monitor your temperature daily to not any changes and increases.   It has been determined that you no longer need hospitalization and can recover while remaining in self-quarantine at home. You should follow the prevention steps below until a healthcare provider or local or state health department says you can return to your normal activities.  1. You should restrict activities outside your home, except for getting medical care.  2. Do not go to work, school, or public areas.  3. Avoid using public transportation, ride-sharing, or taxis.  4. Separate yourself from other people and animals in your home.  5. Call ahead before visiting your doctor.  6. Wear a facemask.  7. Cover your coughs and sneezes.  8. Clean your hands often.  9. Avoid sharing personal household items.    10. Clean all “high-touch” surfaces everyday.11. Monitor your symptoms.  If you have a medical emergency and need to call 911, notify the dispatch personnel that you have COVID-19 If possible, put on a facemask before emergency medical services arrive.  12. Stopping home isolation.  Patients with confirmed COVID-19 should remain under home isolation precautions for 14 days since the positive COVID-19 test and until the risk of secondary transmission to others is thought to be low. The decision to discontinue home isolation precautions should be made on a case-by-case basis, in consultation with healthcare providers and state and local health departments. Your [facility] Department of Health can be reached at [phone] for further information about COVID-19. You must self quarantine to complete a 14 day time period from the date of your diagnosis.  Monitor for fevers, shortness of breath and cough primarily.  Monitor your temperature daily to not any changes and increases.   It has been determined that you no longer need hospitalization and can recover while remaining in self-quarantine at home. You should follow the prevention steps below until a healthcare provider or local or state health department says you can return to your normal activities.  1. You should restrict activities outside your home, except for getting medical care.  2. Do not go to work, school, or public areas.  3. Avoid using public transportation, ride-sharing, or taxis.  4. Separate yourself from other people and animals in your home.  5. Call ahead before visiting your doctor.  6. Wear a facemask.  7. Cover your coughs and sneezes.  8. Clean your hands often.  9. Avoid sharing personal household items.    10. Clean all “high-touch” surfaces everyday.11. Monitor your symptoms.  If you have a medical emergency and need to call 911, notify the dispatch personnel that you have COVID-19 If possible, put on a facemask before emergency medical services arrive.  12. Stopping home isolation.  Patients with confirmed COVID-19 should remain under home isolation precautions for 14 days since the positive COVID-19 test and until the risk of secondary transmission to others is thought to be low. The decision to discontinue home isolation precautions should be made on a case-by-case basis, in consultation with healthcare providers and state and local health departments. Your [facility] Department of Health can be reached at [phone] for further information about COVID-19. -Continue and finish all antibiotics as prescribed  You must self quarantine to complete a 14 day time period from the date of your diagnosis.  Monitor for fevers, shortness of breath and cough primarily.  Monitor your temperature daily to not any changes and increases.   It has been determined that you no longer need hospitalization and can recover while remaining in self-quarantine at home. You should follow the prevention steps below until a healthcare provider or local or state health department says you can return to your normal activities.  1. You should restrict activities outside your home, except for getting medical care.  2. Do not go to work, school, or public areas.  3. Avoid using public transportation, ride-sharing, or taxis.  4. Separate yourself from other people and animals in your home.  5. Call ahead before visiting your doctor.  6. Wear a facemask.  7. Cover your coughs and sneezes.  8. Clean your hands often.  9. Avoid sharing personal household items.    10. Clean all “high-touch” surfaces everyday.11. Monitor your symptoms.  If you have a medical emergency and need to call 911, notify the dispatch personnel that you have COVID-19 If possible, put on a facemask before emergency medical services arrive.  12. Stopping home isolation.  Patients with confirmed COVID-19 should remain under home isolation precautions for 14 days since the positive COVID-19 test and until the risk of secondary transmission to others is thought to be low. The decision to discontinue home isolation precautions should be made on a case-by-case basis, in consultation with healthcare providers and state and local health departments. Your [facility] Department of Health can be reached at [phone] for further information about COVID-19.

## 2020-04-10 NOTE — PROGRESS NOTE ADULT - ASSESSMENT
25 yo  at 30w3d gestation (LISSETT 20) presents with SOB, tachycardia to 110s, O2 sats wnl in RA at rest, but desaturation noted to 80s on ambulation in the ED. Patient tachycardic to 110s-120s, afebrile. CTA neg for PE and sig for patchy foci of consolidation concerning for COVID.  Overnight pt had a fever of 39C. She has maintained sPO2>95% on RA.  No acute obstetric complaints.

## 2020-04-10 NOTE — PROGRESS NOTE ADULT - SUBJECTIVE AND OBJECTIVE BOX
Antepartum note    Patient seen and examined at bedside. Overnight pt had a fever of 39C. She has been afebrile since. She is on RA.     Pt reports +FM. Denies LOF, VB, ctx, HA, epigastric pain, blurred vision, CP, SOB, N/V, fevers, and chills.    Vital Signs Last 24 Hours  T(C): 36.8 (04-10-20 @ 06:06), Max: 39 (04-10-20 @ 00:00)  HR: 101 (04-10-20 @ 06:06) (101 - 149)  BP: 117/69 (04-10-20 @ 06:06) (112/69 - 144/57)  RR: 20 (04-10-20 @ 06:06) (19 - 24)  SpO2: 99% (04-10-20 @ 06:06) (82% - 100%)    CAPILLARY BLOOD GLUCOSE          Physical Exam:  General: NAD  Abdomen: Soft, non-tender, gravid  Ext: No pain or swelling    Labs:             12.3   7.72  )-----------( 111      ( 04-10 @ 01:29 )             36.5     04-10 @ 01:29    131  |  99  |  5   ----------------------------<  107  3.7   |  18  |  0.48    Ca    8.4      04-10 @ 01:29    TPro  6.2  /  Alb  3.3  /  TBili  0.4  /  DBili  x   /  AST  77  /  ALT  49  /  AlkPhos  120  04-10 @ 01:29    PT/INR - ( 04-10 @ 01:29 )   PT: 11.0 SEC;   INR: 0.97     PTT - ( 04-10 @ 01:29 )  PTT:33.8 SEC    Uric Acid: (04-10 @ 01:29)  2.4      Fibrinogen: (04-10 @ 01:29)  483.0    LDH: (04-10 @ 01:29)  254        MEDICATIONS  (STANDING):  heparin  Injectable 5000 Unit(s) SubCutaneous every 12 hours  progesterone Vaginal Insert 200 milliGRAM(s) Vaginal daily    MEDICATIONS  (PRN):  acetaminophen   Tablet .. 975 milliGRAM(s) Oral every 6 hours PRN Temp greater or equal to 38C (100.4F)

## 2020-04-10 NOTE — DISCHARGE NOTE ANTEPARTUM - MEDICATION SUMMARY - MEDICATIONS TO TAKE
I will START or STAY ON the medications listed below when I get home from the hospital:    Prenatal Multivitamins with Folic Acid 1 mg oral tablet  -- 1 tab(s) by mouth once a day  -- Indication: For pregnancy    progesterone 100 mg vaginal insert  --  vaginally   -- Indication: For Short cervix    folic acid 1 mg oral tablet  -- 1 tab(s) by mouth once a day  -- Indication: For pregnancy I will START or STAY ON the medications listed below when I get home from the hospital:    cefuroxime 500 mg oral tablet  -- 1 tab(s) by mouth every 12 hours  -- Indication: For Pneumonia affecting pregnancy    Prenatal Multivitamins with Folic Acid 1 mg oral tablet  -- 1 tab(s) by mouth once a day  -- Indication: For pregnancy    azithromycin 500 mg oral tablet  -- 1 tab(s) by mouth once a day  -- Indication: For Pneumonia affecting pregnancy    progesterone 100 mg vaginal insert  --  vaginally   -- Indication: For Short cervix    folic acid 1 mg oral tablet  -- 1 tab(s) by mouth once a day  -- Indication: For pregnancy

## 2020-04-10 NOTE — DISCHARGE NOTE ANTEPARTUM - CARE PROVIDER_API CALL
Elena Hollingsworth)  Obstetrics and Gynecology  [address] [place] [address]  Phone: [phone]  Fax: [phone]  Follow Up Time:

## 2020-04-10 NOTE — DISCHARGE NOTE ANTEPARTUM - HOSPITAL COURSE
23 yo  at 30w3d gestation (LISSETT 20) presents with SOB, tachycardia to 110s, O2 sats wnl in RA at rest. Patient tachycardic to 110s-120s, afebrile. CTA neg for PE and sig for patchy foci of consolidation concerning for COVID.  Overnight pt had a fever of 39C. She has maintained sPO2>95% on RA.  NST reactive. 25 yo  at 30w3d gestation (LISSETT 20) presents with SOB, tachycardia to 110s, O2 sats wnl in RA at rest. Patient tachycardic to 110s-120s.  EKG Sinus tachy. Thyroid studies obtained WNL. CTA neg for PE and sig for patchy foci of consolidation concerning for COVID. COVID swab negative x 2.  Pt last febrile on  at 2pm. O2 sat has been maintained > 95% on room air. ID consulted; recommended ceftriaxone/azithromycin for community acquired pneumonia; held plaquinel. Per ID presumed COVID. RVP swab sent. On HD 5 patient c/o right thigh pain, LE Dopplers obtained: WNL.  Pt stable for discharge home to finish 5 day course of antibiotics with close outpatient follow up in Office within 2 weeks and self isolation at home.

## 2020-04-10 NOTE — DISCHARGE NOTE ANTEPARTUM - PATIENT PORTAL LINK FT
You can access the FollowMyHealth Patient Portal offered by [facility] by registering at the following website: [email or web address]. By joining [facility]’s FollowMyHealth portal, you will also be able to view your health information using other applications (apps) compatible with our system.

## 2020-04-10 NOTE — PROGRESS NOTE ADULT - PROBLEM SELECTOR PLAN 1
-CTA neg for PE  -F/u COVID swab  -AM T+S and COVID labs  -Reg diet  -po Tylenol for fever  -Appreciate med consult  -Pt currently on RA. Will measure O2 sat with ambulation.   -NST Daily    RShibata, pgy3 -CTA neg for PE. Pt was tachycardic on arrival. This AM HR was 103. Will consider TSH if pt remains tachycardic   -F/u COVID swab  -AM T+S and COVID labs  -Reg diet  -po Tylenol for fever  -Pt currently on RA. Will measure O2 sat with ambulation.   -NST Daily    RShibata, pgy3

## 2020-04-10 NOTE — CONSULT NOTE ADULT - SUBJECTIVE AND OBJECTIVE BOX
"HPI: 23 yo  at 30+2 wks gestation (LISSETT 20) presents with worsening SOB, and sharp chest CP x3 days. SOB worse when laying flat and on ambulation and climbing stairs. She reports her step mom has had similar symptoms, but unk COVID status. She denies cough, fevers, chills. She reports nausea, but no vomiting, Tolerating PO. Reports mild HA earlier, improved with Tylenol in the ED. Denies associated changes in vision, lightheadedness, dizziness, epigastric pain, RUQ pain, diarrhea, constipation, dysuria, frequency, hematuria. Prenatal care c/b short cervix on vaginal progesterone 200 daily. She currently denies contractions, loss of fluids, vaginal bleeding and endorses +fetal movement.     OB Provider: Dr. Elena Matthew ([facility])    OBhx - TOPx1 s/p D&C (11 yrs ago)   GYNHx - small fibroids  PMHx - Asthma, well controlled (last Albuterol use in , no hospitalizations, intubations)   PSHx - D&C x1, elbow surgery   Meds - PNV, Vag progesterone 200 QD  Allergies - ASA (rxn: SOB, asthma exacerbation)   Social - denies tobacco, alcohol, recreational drug use  Psych - anxiety (no meds) (2020 21:36)"    Above reviewed. Saw/spoke to patient. Patient 30 weeks gestation presenting with episode of SOB, chest pain. Here developed episode fever. Generally feels well, and saturating well on RA. No diarrhea, no abd pain, no vaginal discharge, no dysuria, no pyuria. No other new complaints. ID called for further eval, ? covid in pregnancy.    PAST MEDICAL & SURGICAL HISTORY:  Asthma  H/O elbow surgery  S/P D&C (status post dilation and curettage)    Allergies    aspirin (Short breath)    ANTIMICROBIALS:  Off    OTHER MEDS:  acetaminophen   Tablet .. 975 milliGRAM(s) Oral every 6 hours PRN  folic acid 1 milliGRAM(s) Oral daily  heparin  Injectable 5000 Unit(s) SubCutaneous every 12 hours  prenatal multivitamin 1 Tablet(s) Oral daily  progesterone Vaginal Insert 200 milliGRAM(s) Vaginal daily    SOCIAL HISTORY: No tobacco, no alcohol, no illicit drugs    FAMILY HISTORY: Stepmother with flu-like illness    Drug Dosing Weight  Height (cm): 162.56 (10 Apr 2020 10:)  Weight (kg): 96.4 (10 Apr 2020 10:)  BMI (kg/m2): 36.5 (10 Apr 2020 10:31)  BSA (m2): 2.01 (10 Apr 2020 10:31)    PE:    Vital Signs Last 24 Hrs  T(C): 37.3 (10 Apr 2020 10:49), Max: 39 (10 Apr 2020 00:00)  T(F): 99.2 (10 Apr 2020 10:49), Max: 102.2 (10 Apr 2020 00:00)  HR: 120 (10 Apr 2020 10:31) (101 - 149)  BP: 117/71 (10 Apr 2020 10:31) (112/69 - 144/57)  RR: 22 (10 Apr 2020 10:31) (19 - 24)  SpO2: 98% (10 Apr 2020 10:31) (82% - 100%)    Gen: AOx3, NAD, non-toxic  CV: S1+S2 normal, tachycardic  Resp: Clear bilat, no resp distress, no crackles/wheezes  Abd: Soft, nontender, +BS  Ext: No LE edema, no wounds  : No Hoover  IV/Skin: No thrombophlebitis  Msk: No low back pain, no arthralgias, no joint swelling  Neuro: No sensory deficits, no motor deficits    LABS:                        12.3   7.72  )-----------( 111      ( 10 Apr 2020 01:29 )             36.5     04-10    131<L>  |  99  |  5<L>  ----------------------------<  107<H>  3.7   |  18<L>  |  0.48<L>    Ca    8.4      10 Apr 2020 01:29    TPro  6.2  /  Alb  3.3  /  TBili  0.4  /  DBili  x   /  AST  77<H>  /  ALT  49<H>  /  AlkPhos  120  04-10    MICROBIOLOGY:    COVID PCR pending    RADIOLOGY:     CT:    IMPRESSION:     1.  No pulmonary emboli.  2.  The patchy foci of consolidation can occur in the setting of infection, including atypical causes of infection (Covid 19) given the current clinical environment.

## 2020-04-11 LAB
ALBUMIN SERPL ELPH-MCNC: 3.2 G/DL — LOW (ref 3.3–5)
ALP SERPL-CCNC: 127 U/L — HIGH (ref 40–120)
ALT FLD-CCNC: 58 U/L — HIGH (ref 4–33)
ANION GAP SERPL CALC-SCNC: 14 MMO/L — SIGNIFICANT CHANGE UP (ref 7–14)
APTT BLD: 37.5 SEC — HIGH (ref 27.5–36.3)
AST SERPL-CCNC: 98 U/L — HIGH (ref 4–32)
BASOPHILS # BLD AUTO: 0.04 K/UL — SIGNIFICANT CHANGE UP (ref 0–0.2)
BASOPHILS NFR BLD AUTO: 0.5 % — SIGNIFICANT CHANGE UP (ref 0–2)
BILIRUB SERPL-MCNC: 0.4 MG/DL — SIGNIFICANT CHANGE UP (ref 0.2–1.2)
BUN SERPL-MCNC: 4 MG/DL — LOW (ref 7–23)
CALCIUM SERPL-MCNC: 8.7 MG/DL — SIGNIFICANT CHANGE UP (ref 8.4–10.5)
CHLORIDE SERPL-SCNC: 101 MMOL/L — SIGNIFICANT CHANGE UP (ref 98–107)
CO2 SERPL-SCNC: 20 MMOL/L — LOW (ref 22–31)
CREAT SERPL-MCNC: 0.56 MG/DL — SIGNIFICANT CHANGE UP (ref 0.5–1.3)
CRP SERPL-MCNC: 32.4 MG/L — HIGH
EOSINOPHIL # BLD AUTO: 0.01 K/UL — SIGNIFICANT CHANGE UP (ref 0–0.5)
EOSINOPHIL NFR BLD AUTO: 0.1 % — SIGNIFICANT CHANGE UP (ref 0–6)
FERRITIN SERPL-MCNC: 36.48 NG/ML — SIGNIFICANT CHANGE UP (ref 15–150)
FIBRINOGEN PPP-MCNC: 435 MG/DL — SIGNIFICANT CHANGE UP (ref 300–520)
GLUCOSE SERPL-MCNC: 96 MG/DL — SIGNIFICANT CHANGE UP (ref 70–99)
HCT VFR BLD CALC: 36.6 % — SIGNIFICANT CHANGE UP (ref 34.5–45)
HGB BLD-MCNC: 12.5 G/DL — SIGNIFICANT CHANGE UP (ref 11.5–15.5)
IMM GRANULOCYTES NFR BLD AUTO: 5.8 % — HIGH (ref 0–1.5)
INR BLD: 0.97 — SIGNIFICANT CHANGE UP (ref 0.88–1.17)
LDH SERPL L TO P-CCNC: 257 U/L — HIGH (ref 135–225)
LYMPHOCYTES # BLD AUTO: 1.23 K/UL — SIGNIFICANT CHANGE UP (ref 1–3.3)
LYMPHOCYTES # BLD AUTO: 16.2 % — SIGNIFICANT CHANGE UP (ref 13–44)
MCHC RBC-ENTMCNC: 31.6 PG — SIGNIFICANT CHANGE UP (ref 27–34)
MCHC RBC-ENTMCNC: 34.2 % — SIGNIFICANT CHANGE UP (ref 32–36)
MCV RBC AUTO: 92.4 FL — SIGNIFICANT CHANGE UP (ref 80–100)
MONOCYTES # BLD AUTO: 0.29 K/UL — SIGNIFICANT CHANGE UP (ref 0–0.9)
MONOCYTES NFR BLD AUTO: 3.8 % — SIGNIFICANT CHANGE UP (ref 2–14)
NEUTROPHILS # BLD AUTO: 5.57 K/UL — SIGNIFICANT CHANGE UP (ref 1.8–7.4)
NEUTROPHILS NFR BLD AUTO: 73.6 % — SIGNIFICANT CHANGE UP (ref 43–77)
NRBC # FLD: 0 K/UL — SIGNIFICANT CHANGE UP (ref 0–0)
PLATELET # BLD AUTO: 112 K/UL — LOW (ref 150–400)
PMV BLD: 11.2 FL — SIGNIFICANT CHANGE UP (ref 7–13)
POTASSIUM SERPL-MCNC: 3.6 MMOL/L — SIGNIFICANT CHANGE UP (ref 3.5–5.3)
POTASSIUM SERPL-SCNC: 3.6 MMOL/L — SIGNIFICANT CHANGE UP (ref 3.5–5.3)
PROCALCITONIN SERPL-MCNC: 0.52 NG/ML — HIGH (ref 0.02–0.1)
PROT SERPL-MCNC: 6.5 G/DL — SIGNIFICANT CHANGE UP (ref 6–8.3)
PROTHROM AB SERPL-ACNC: 11.1 SEC — SIGNIFICANT CHANGE UP (ref 9.8–13.1)
RBC # BLD: 3.96 M/UL — SIGNIFICANT CHANGE UP (ref 3.8–5.2)
RBC # FLD: 12.5 % — SIGNIFICANT CHANGE UP (ref 10.3–14.5)
SARS-COV-2 RNA SPEC QL NAA+PROBE: SIGNIFICANT CHANGE UP
SARS-COV-2 RNA SPEC QL NAA+PROBE: SIGNIFICANT CHANGE UP
SODIUM SERPL-SCNC: 135 MMOL/L — SIGNIFICANT CHANGE UP (ref 135–145)
URATE SERPL-MCNC: 2.4 MG/DL — LOW (ref 2.5–7)
WBC # BLD: 7.58 K/UL — SIGNIFICANT CHANGE UP (ref 3.8–10.5)
WBC # FLD AUTO: 7.58 K/UL — SIGNIFICANT CHANGE UP (ref 3.8–10.5)

## 2020-04-11 PROCEDURE — 99233 SBSQ HOSP IP/OBS HIGH 50: CPT

## 2020-04-11 PROCEDURE — 99232 SBSQ HOSP IP/OBS MODERATE 35: CPT

## 2020-04-11 RX ORDER — AZITHROMYCIN 500 MG/1
500 TABLET, FILM COATED ORAL DAILY
Refills: 0 | Status: DISCONTINUED | OUTPATIENT
Start: 2020-04-11 | End: 2020-04-14

## 2020-04-11 RX ORDER — CEFTRIAXONE 500 MG/1
1000 INJECTION, POWDER, FOR SOLUTION INTRAMUSCULAR; INTRAVENOUS EVERY 24 HOURS
Refills: 0 | Status: DISCONTINUED | OUTPATIENT
Start: 2020-04-11 | End: 2020-04-14

## 2020-04-11 RX ADMIN — HEPARIN SODIUM 5000 UNIT(S): 5000 INJECTION INTRAVENOUS; SUBCUTANEOUS at 17:55

## 2020-04-11 RX ADMIN — Medication 1 MILLIGRAM(S): at 12:59

## 2020-04-11 RX ADMIN — Medication 1 TABLET(S): at 12:59

## 2020-04-11 RX ADMIN — PROGESTERONE 200 MILLIGRAM(S): 200 CAPSULE, LIQUID FILLED ORAL at 22:10

## 2020-04-11 RX ADMIN — AZITHROMYCIN 500 MILLIGRAM(S): 500 TABLET, FILM COATED ORAL at 15:21

## 2020-04-11 RX ADMIN — HEPARIN SODIUM 5000 UNIT(S): 5000 INJECTION INTRAVENOUS; SUBCUTANEOUS at 06:12

## 2020-04-11 RX ADMIN — Medication 975 MILLIGRAM(S): at 14:06

## 2020-04-11 RX ADMIN — CEFTRIAXONE 100 MILLIGRAM(S): 500 INJECTION, POWDER, FOR SOLUTION INTRAMUSCULAR; INTRAVENOUS at 15:21

## 2020-04-11 RX ADMIN — Medication 975 MILLIGRAM(S): at 02:11

## 2020-04-11 NOTE — PROGRESS NOTE ADULT - ASSESSMENT
25 yo  at 30w3d gestation (LISSETT 20) presents with SOB, tachycardia to 110s, O2 sats wnl in RA at rest, but desaturation noted to 80s on ambulation in the ED. CTA neg for PE and sig for patchy foci of consolidation concerning for COVID, however COVID PCR negative..  Overnight pt had a fever of 38 with tachycardia 110-120s. . She has maintained sPO2>98-99% on RA.  No acute obstetric complaints.

## 2020-04-11 NOTE — PROVIDER CONTACT NOTE (CHANGE IN STATUS NOTIFICATION) - ASSESSMENT
Pt c/o " feeling hot" - temp 102.2 orally at 1400 - Blood cultures x2 obtained this am, Urine C+S pending. MD in to see  pt this am - Rocephin and Zithromax ordered, pt refusing at present. Infonotes on Zithromax and Rocephin provided to pt, Dr Rivers addressed pts concerns via voicera x2 - pt still " thinking about it". Tylenol 975mg po x1 given as per DOS

## 2020-04-11 NOTE — PROGRESS NOTE ADULT - SUBJECTIVE AND OBJECTIVE BOX
R3 Antepartum Note, HD#3    Interval events: Pt had a fever 38C at 2am Pt also had associated tachycardia that resolved with Tylenol.    Patient seen and examined at bedside. No acute complaints. Pt reports +FM, denies LOF, VB, ctx, HA, epigastric pain, blurred vision, CP, SOB, N/V, fevers, and chills.    Vital Signs Last 24 Hours  T(C): 36.6 (04-11-20 @ 06:08), Max: 38.2 (04-10-20 @ 15:23)  HR: 103 (04-11-20 @ 07:34) (103 - 231)  BP: 101/59 (04-11-20 @ 06:08) (101/59 - 127/81)  RR: 20 (04-11-20 @ 06:08) (20 - 28)  SpO2: 96% (04-11-20 @ 07:39) (89% - 100%)      Physical Exam:  General: NAD  Abdomen: Soft, non-tender, gravid  Ext: No pain or swelling    NST reactive overnight    Labs:             12.5   7.58  )-----------( 112      ( 04-11 @ 06:40 )             36.6     04-10 @ 10:08    132  |  101  |  5   ----------------------------<  103  3.7   |  19  |  0.44    Ca    8.2      04-10 @ 10:08    TPro  6.2  /  Alb  3.0  /  TBili  0.5  /  DBili  x   /  AST  75  /  ALT  47  /  AlkPhos  117  04-10 @ 10:08    PT/INR - ( 04-11 @ 06:40 )   PT: 11.1 SEC;   INR: 0.97     PTT - ( 04-11 @ 06:40 )  PTT:37.5 SEC    Uric Acid: (04-11 @ 06:40)  --       Fibrinogen: (04-11 @ 06:40)  435.0    LDH: (04-11 @ 06:40)  --         MEDICATIONS  (STANDING):  folic acid 1 milliGRAM(s) Oral daily  heparin  Injectable 5000 Unit(s) SubCutaneous every 12 hours  prenatal multivitamin 1 Tablet(s) Oral daily  progesterone Vaginal Insert 200 milliGRAM(s) Vaginal daily    MEDICATIONS  (PRN):  acetaminophen   Tablet .. 975 milliGRAM(s) Oral every 6 hours PRN Temp greater or equal to 38C (100.4F)

## 2020-04-11 NOTE — PROGRESS NOTE ADULT - PROBLEM SELECTOR PLAN 1
-CTA neg for PE. Pt was tachycardic on arrival. This AM HR was 103. Will consider TSH if pt remains tachycardic   -COVID 4/9 negative. Per ID repeat as there is high suspicion for COVID19.   -Appreciate ID recommendations: no need to start HCQ  -Repeat COVID PCR obtained this AM (4/11)  -AM HELLP labs,  COVID labs  -Reg diet  -po Tylenol for fever  -Pt currently on RA. Will measure O2 sat with ambulation.   -NST Daily    YUMIKO Santos PGY3

## 2020-04-11 NOTE — PROGRESS NOTE ADULT - ASSESSMENT
23 yo  at 30+2 wks gestation (LISSETT 20) presents with worsening SOB, and sharp chest CP x3 days  Fever, no leukocytosis  CT chest with vague mild ground glass opacities--I reviewed personally  Elevated LFTs  Generally mild illness, but in pregnant patient  Overall,    1) Suspected COVID  - PCR negative times two- covid still high suspicion    - O2 supplementation per primary team  - Hold on plaquenil/covid directed medications presently (patient saturating well on RA)    -check outpt labs for HIV testing    - reasonable to consider a trial of CAP tx with ceftriaxone / azithromycin (ceftin/azithromycin at discharge)  for 5 days but COVID seems most likely      2) Fever  - Monitor for further episodes fever/potential sources of infection

## 2020-04-11 NOTE — PROGRESS NOTE ADULT - SUBJECTIVE AND OBJECTIVE BOX
Follow Up:      Inverval History/ROS:Patient is a 24y old  Female who presents with a chief complaint of SOB, Cough (10 Apr 2020 11:59)  + fever  97-99 percent on room air    Allergies    aspirin (Short breath)    Intolerances        ANTIMICROBIALS:  azithromycin   Tablet 500 daily  cefTRIAXone   IVPB 1000 every 24 hours      OTHER MEDS:  acetaminophen   Tablet .. 975 milliGRAM(s) Oral every 6 hours PRN  folic acid 1 milliGRAM(s) Oral daily  heparin  Injectable 5000 Unit(s) SubCutaneous every 12 hours  prenatal multivitamin 1 Tablet(s) Oral daily  progesterone Vaginal Insert 200 milliGRAM(s) Vaginal daily      Vital Signs Last 24 Hrs  T(C): 37.4 (11 Apr 2020 11:30), Max: 38.2 (10 Apr 2020 15:23)  T(F): 99.4 (11 Apr 2020 11:30), Max: 100.8 (10 Apr 2020 15:23)  HR: 121 (11 Apr 2020 13:31) (103 - 231)  BP: 101/61 (11 Apr 2020 10:10) (101/59 - 127/81)  BP(mean): --  RR: 18 (11 Apr 2020 10:10) (18 - 28)  SpO2: 99% (11 Apr 2020 13:31) (89% - 100%)    PHYSICAL EXAM:  General: [x ] non-toxic  HEAD/EYES: [ ] PERRL [ x] white sclera [ ] icterus  ENT:  [ ] normal [x ] supple [ ] thrush [ ] pharyngeal exudate  Cardiovascular:   [ ] murmur [ ] normal [ ] PPM/AICD  Respiratory:  [ ] clear to ausculation bilaterally  GI:  [x ] soft, non-tender, normal bowel sounds  :  [ ] hdz [ ] no CVA tenderness   Musculoskeletal:  [ ] no synovitis  Neurologic:  [ ] non-focal exam   Skin:  [ x] no rash  Lymph: [x ]x no lymphadenopathy  Psychiatric:  [ ] appropriate affect [ ] alert & oriented  Lines:  [ ] no phlebitis [ ] central line                                12.5   7.58  )-----------( 112      ( 11 Apr 2020 06:40 )             36.6       04-11    135  |  101  |  4<L>  ----------------------------<  96  3.6   |  20<L>  |  0.56    Ca    8.7      11 Apr 2020 06:40    TPro  6.5  /  Alb  3.2<L>  /  TBili  0.4  /  DBili  x   /  AST  98<H>  /  ALT  58<H>  /  AlkPhos  127<H>  04-11          MICROBIOLOGY:    RADIOLOGY:

## 2020-04-12 DIAGNOSIS — Z34.90 ENCOUNTER FOR SUPERVISION OF NORMAL PREGNANCY, UNSPECIFIED, UNSPECIFIED TRIMESTER: ICD-10-CM

## 2020-04-12 LAB
CULTURE RESULTS: SIGNIFICANT CHANGE UP
SPECIMEN SOURCE: SIGNIFICANT CHANGE UP

## 2020-04-12 PROCEDURE — 99232 SBSQ HOSP IP/OBS MODERATE 35: CPT

## 2020-04-12 RX ORDER — SENNA PLUS 8.6 MG/1
2 TABLET ORAL AT BEDTIME
Refills: 0 | Status: DISCONTINUED | OUTPATIENT
Start: 2020-04-12 | End: 2020-04-14

## 2020-04-12 RX ADMIN — Medication 1 MILLIGRAM(S): at 10:19

## 2020-04-12 RX ADMIN — Medication 975 MILLIGRAM(S): at 01:19

## 2020-04-12 RX ADMIN — HEPARIN SODIUM 5000 UNIT(S): 5000 INJECTION INTRAVENOUS; SUBCUTANEOUS at 17:14

## 2020-04-12 RX ADMIN — HEPARIN SODIUM 5000 UNIT(S): 5000 INJECTION INTRAVENOUS; SUBCUTANEOUS at 06:54

## 2020-04-12 RX ADMIN — SENNA PLUS 2 TABLET(S): 8.6 TABLET ORAL at 01:09

## 2020-04-12 RX ADMIN — AZITHROMYCIN 500 MILLIGRAM(S): 500 TABLET, FILM COATED ORAL at 10:19

## 2020-04-12 RX ADMIN — CEFTRIAXONE 100 MILLIGRAM(S): 500 INJECTION, POWDER, FOR SOLUTION INTRAMUSCULAR; INTRAVENOUS at 17:14

## 2020-04-12 RX ADMIN — Medication 1 TABLET(S): at 10:19

## 2020-04-12 RX ADMIN — Medication 975 MILLIGRAM(S): at 14:42

## 2020-04-12 RX ADMIN — PROGESTERONE 200 MILLIGRAM(S): 200 CAPSULE, LIQUID FILLED ORAL at 22:20

## 2020-04-12 NOTE — PROGRESS NOTE ADULT - PROBLEM SELECTOR PLAN 1
-CTA neg for PE. Pt was tachycardic throughout admission. This AM HR was 110s.  -Appreciate ID recommendations: no need to start HCQ  -Repeat COVID PCR obtained 4/11- negative x 2  -azithromycin and ceftriaxone (4/11- ) due to presumed community acquired pneumonia as COVID swab negative x2  -po Tylenol for fever  -NST Daily    T Les PGY3 -CTA neg for PE. Pt was tachycardic throughout admission. This AM HR was 110s.  -Appreciate ID recommendations: no need to start HCQ  -Repeat COVID PCR obtained 4/11- negative x 2  -azithromycin and ceftriaxone (4/11- ) due to presumed community acquired pneumonia as COVID swab negative x2  -po Tylenol for fever

## 2020-04-12 NOTE — PROGRESS NOTE ADULT - SUBJECTIVE AND OBJECTIVE BOX
R3 Antepartum Note, HD#4    Interval events: Pt with a fever 38.2C at 1am with tachycardia to 120s at that time, HR decreased to 110s when seen on rounds.     Patient seen and examined at bedside. No acute complaints. Pt reports +FM, denies LOF, VB, ctx, HA, epigastric pain, blurred vision, CP, SOB, N/V, fevers, and chills.  Pt reports she has been tolerating a regular diet, ambulating minimally around room, reports dyspnea on exertion with ambulation.     Vital Signs Last 24 Hrs  T(C): 37.1 (12 Apr 2020 02:13), Max: 39 (11 Apr 2020 14:02)  T(F): 98.8 (12 Apr 2020 02:13), Max: 102.2 (11 Apr 2020 14:02)  HR: 111 (12 Apr 2020 04:32) (103 - 186)  BP: 116/64 (12 Apr 2020 01:12) (86/53 - 116/64)  BP(mean): --  RR: 20 (12 Apr 2020 01:12) (18 - 21)  SpO2: 96% (12 Apr 2020 04:32) (87% - 100%)    Physical Exam:  General: NAD  Abdomen: Soft, non-tender, gravid  Ext: No pain or swelling      Labs:             12.5   7.58  )-----------( 112      ( 04-11 @ 06:40 )             36.6     04-10 @ 10:08    132  |  101  |  5   ----------------------------<  103  3.7   |  19  |  0.44    Ca    8.2      04-10 @ 10:08    TPro  6.2  /  Alb  3.0  /  TBili  0.5  /  DBili  x   /  AST  75  /  ALT  47  /  AlkPhos  117  04-10 @ 10:08    PT/INR - ( 04-11 @ 06:40 )   PT: 11.1 SEC;   INR: 0.97     PTT - ( 04-11 @ 06:40 )  PTT:37.5 SEC    Uric Acid: (04-11 @ 06:40)  --       Fibrinogen: (04-11 @ 06:40)  435.0    LDH: (04-11 @ 06:40)  --         MEDICATIONS  (STANDING):  folic acid 1 milliGRAM(s) Oral daily  heparin  Injectable 5000 Unit(s) SubCutaneous every 12 hours  prenatal multivitamin 1 Tablet(s) Oral daily  progesterone Vaginal Insert 200 milliGRAM(s) Vaginal daily    MEDICATIONS  (PRN):  acetaminophen   Tablet .. 975 milliGRAM(s) Oral every 6 hours PRN Temp greater or equal to 38C (100.4F)

## 2020-04-12 NOTE — PROGRESS NOTE ADULT - ASSESSMENT
23 yo  at 30w4d gestation (LISSETT 20) presents with SOB, tachycardia to 120s, O2 sats wnl in RA at rest, but desaturation noted to 80s on ambulation in the ED. CTA neg for PE and sig for patchy foci of consolidation concerning for COVID, however COVID PCR negative.    Overnight pt had a fever of 38.2 with tachycardia 110-120s.  She has maintained sPO2>97-99% on RA.  No acute obstetric complaints.

## 2020-04-13 DIAGNOSIS — O99.519 DISEASES OF THE RESPIRATORY SYSTEM COMPLICATING PREGNANCY, UNSPECIFIED TRIMESTER: ICD-10-CM

## 2020-04-13 LAB
ALBUMIN SERPL ELPH-MCNC: 2.8 G/DL — LOW (ref 3.3–5)
ALP SERPL-CCNC: 151 U/L — HIGH (ref 40–120)
ALT FLD-CCNC: 103 U/L — HIGH (ref 4–33)
ANION GAP SERPL CALC-SCNC: 14 MMO/L — SIGNIFICANT CHANGE UP (ref 7–14)
ANISOCYTOSIS BLD QL: SLIGHT — SIGNIFICANT CHANGE UP
APTT BLD: 35.9 SEC — SIGNIFICANT CHANGE UP (ref 27.5–36.3)
AST SERPL-CCNC: 177 U/L — HIGH (ref 4–32)
B PERT DNA SPEC QL NAA+PROBE: NOT DETECTED — SIGNIFICANT CHANGE UP
BASOPHILS # BLD AUTO: 0.06 K/UL — SIGNIFICANT CHANGE UP (ref 0–0.2)
BASOPHILS NFR BLD AUTO: 0.6 % — SIGNIFICANT CHANGE UP (ref 0–2)
BASOPHILS NFR SPEC: 0 % — SIGNIFICANT CHANGE UP (ref 0–2)
BILIRUB SERPL-MCNC: 0.5 MG/DL — SIGNIFICANT CHANGE UP (ref 0.2–1.2)
BLASTS # FLD: 0 % — SIGNIFICANT CHANGE UP (ref 0–0)
BLD GP AB SCN SERPL QL: NEGATIVE — SIGNIFICANT CHANGE UP
BUN SERPL-MCNC: 3 MG/DL — LOW (ref 7–23)
BURR CELLS BLD QL SMEAR: PRESENT — SIGNIFICANT CHANGE UP
C PNEUM DNA SPEC QL NAA+PROBE: NOT DETECTED — SIGNIFICANT CHANGE UP
CALCIUM SERPL-MCNC: 8.4 MG/DL — SIGNIFICANT CHANGE UP (ref 8.4–10.5)
CHLORIDE SERPL-SCNC: 101 MMOL/L — SIGNIFICANT CHANGE UP (ref 98–107)
CO2 SERPL-SCNC: 18 MMOL/L — LOW (ref 22–31)
CREAT SERPL-MCNC: 0.53 MG/DL — SIGNIFICANT CHANGE UP (ref 0.5–1.3)
CRP SERPL-MCNC: 46.6 MG/L — HIGH
D DIMER BLD IA.RAPID-MCNC: 505 NG/ML — SIGNIFICANT CHANGE UP
EOSINOPHIL # BLD AUTO: 0.01 K/UL — SIGNIFICANT CHANGE UP (ref 0–0.5)
EOSINOPHIL NFR BLD AUTO: 0.1 % — SIGNIFICANT CHANGE UP (ref 0–6)
EOSINOPHIL NFR FLD: 0 % — SIGNIFICANT CHANGE UP (ref 0–6)
FERRITIN SERPL-MCNC: 145.6 NG/ML — SIGNIFICANT CHANGE UP (ref 15–150)
FIBRINOGEN PPP-MCNC: 529 MG/DL — HIGH (ref 300–520)
FLUAV H1 2009 PAND RNA SPEC QL NAA+PROBE: NOT DETECTED — SIGNIFICANT CHANGE UP
FLUAV H1 RNA SPEC QL NAA+PROBE: NOT DETECTED — SIGNIFICANT CHANGE UP
FLUAV H3 RNA SPEC QL NAA+PROBE: NOT DETECTED — SIGNIFICANT CHANGE UP
FLUAV SUBTYP SPEC NAA+PROBE: NOT DETECTED — SIGNIFICANT CHANGE UP
FLUBV RNA SPEC QL NAA+PROBE: NOT DETECTED — SIGNIFICANT CHANGE UP
GIANT PLATELETS BLD QL SMEAR: PRESENT — SIGNIFICANT CHANGE UP
GLUCOSE SERPL-MCNC: 113 MG/DL — HIGH (ref 70–99)
HADV DNA SPEC QL NAA+PROBE: NOT DETECTED — SIGNIFICANT CHANGE UP
HCOV PNL SPEC NAA+PROBE: SIGNIFICANT CHANGE UP
HCT VFR BLD CALC: 35.8 % — SIGNIFICANT CHANGE UP (ref 34.5–45)
HGB BLD-MCNC: 12.2 G/DL — SIGNIFICANT CHANGE UP (ref 11.5–15.5)
HMPV RNA SPEC QL NAA+PROBE: NOT DETECTED — SIGNIFICANT CHANGE UP
HPIV1 RNA SPEC QL NAA+PROBE: NOT DETECTED — SIGNIFICANT CHANGE UP
HPIV2 RNA SPEC QL NAA+PROBE: NOT DETECTED — SIGNIFICANT CHANGE UP
HPIV3 RNA SPEC QL NAA+PROBE: NOT DETECTED — SIGNIFICANT CHANGE UP
HPIV4 RNA SPEC QL NAA+PROBE: NOT DETECTED — SIGNIFICANT CHANGE UP
IMM GRANULOCYTES NFR BLD AUTO: 9.4 % — HIGH (ref 0–1.5)
INR BLD: 0.97 — SIGNIFICANT CHANGE UP (ref 0.88–1.17)
L PNEUMO AG UR QL: NEGATIVE — SIGNIFICANT CHANGE UP
LDH SERPL L TO P-CCNC: 272 U/L — HIGH (ref 135–225)
LYMPHOCYTES # BLD AUTO: 1.43 K/UL — SIGNIFICANT CHANGE UP (ref 1–3.3)
LYMPHOCYTES # BLD AUTO: 14.4 % — SIGNIFICANT CHANGE UP (ref 13–44)
LYMPHOCYTES NFR SPEC AUTO: 6.2 % — LOW (ref 13–44)
MACROCYTES BLD QL: SLIGHT — SIGNIFICANT CHANGE UP
MCHC RBC-ENTMCNC: 31.4 PG — SIGNIFICANT CHANGE UP (ref 27–34)
MCHC RBC-ENTMCNC: 34.1 % — SIGNIFICANT CHANGE UP (ref 32–36)
MCV RBC AUTO: 92.3 FL — SIGNIFICANT CHANGE UP (ref 80–100)
METAMYELOCYTES # FLD: 3.6 % — HIGH (ref 0–1)
MONOCYTES # BLD AUTO: 0.38 K/UL — SIGNIFICANT CHANGE UP (ref 0–0.9)
MONOCYTES NFR BLD AUTO: 3.8 % — SIGNIFICANT CHANGE UP (ref 2–14)
MONOCYTES NFR BLD: 3.6 % — SIGNIFICANT CHANGE UP (ref 2–9)
MYELOCYTES NFR BLD: 4.5 % — HIGH (ref 0–0)
NEUTROPHIL AB SER-ACNC: 73.2 % — SIGNIFICANT CHANGE UP (ref 43–77)
NEUTROPHILS # BLD AUTO: 7.1 K/UL — SIGNIFICANT CHANGE UP (ref 1.8–7.4)
NEUTROPHILS NFR BLD AUTO: 71.7 % — SIGNIFICANT CHANGE UP (ref 43–77)
NEUTS BAND # BLD: 5.3 % — SIGNIFICANT CHANGE UP (ref 0–6)
NRBC # FLD: 0 K/UL — SIGNIFICANT CHANGE UP (ref 0–0)
OTHER - HEMATOLOGY %: 0 — SIGNIFICANT CHANGE UP
OVALOCYTES BLD QL SMEAR: SLIGHT — SIGNIFICANT CHANGE UP
PLATELET # BLD AUTO: 122 K/UL — LOW (ref 150–400)
PLATELET COUNT - ESTIMATE: SIGNIFICANT CHANGE UP
PMV BLD: 11.5 FL — SIGNIFICANT CHANGE UP (ref 7–13)
POLYCHROMASIA BLD QL SMEAR: SLIGHT — SIGNIFICANT CHANGE UP
POTASSIUM SERPL-MCNC: 3.5 MMOL/L — SIGNIFICANT CHANGE UP (ref 3.5–5.3)
POTASSIUM SERPL-SCNC: 3.5 MMOL/L — SIGNIFICANT CHANGE UP (ref 3.5–5.3)
PROCALCITONIN SERPL-MCNC: 1.13 NG/ML — HIGH (ref 0.02–0.1)
PROMYELOCYTES # FLD: 0 % — SIGNIFICANT CHANGE UP (ref 0–0)
PROT SERPL-MCNC: 5.8 G/DL — LOW (ref 6–8.3)
PROTHROM AB SERPL-ACNC: 11 SEC — SIGNIFICANT CHANGE UP (ref 9.8–13.1)
RBC # BLD: 3.88 M/UL — SIGNIFICANT CHANGE UP (ref 3.8–5.2)
RBC # FLD: 12.9 % — SIGNIFICANT CHANGE UP (ref 10.3–14.5)
RH IG SCN BLD-IMP: POSITIVE — SIGNIFICANT CHANGE UP
RSV RNA SPEC QL NAA+PROBE: NOT DETECTED — SIGNIFICANT CHANGE UP
RV+EV RNA SPEC QL NAA+PROBE: NOT DETECTED — SIGNIFICANT CHANGE UP
SODIUM SERPL-SCNC: 133 MMOL/L — LOW (ref 135–145)
URATE SERPL-MCNC: 2.6 MG/DL — SIGNIFICANT CHANGE UP (ref 2.5–7)
VARIANT LYMPHS # BLD: 3.6 % — SIGNIFICANT CHANGE UP
WBC # BLD: 9.91 K/UL — SIGNIFICANT CHANGE UP (ref 3.8–10.5)
WBC # FLD AUTO: 9.91 K/UL — SIGNIFICANT CHANGE UP (ref 3.8–10.5)

## 2020-04-13 PROCEDURE — 99232 SBSQ HOSP IP/OBS MODERATE 35: CPT | Mod: GC

## 2020-04-13 PROCEDURE — 99232 SBSQ HOSP IP/OBS MODERATE 35: CPT

## 2020-04-13 PROCEDURE — 93970 EXTREMITY STUDY: CPT | Mod: 26

## 2020-04-13 RX ADMIN — Medication 1 TABLET(S): at 10:00

## 2020-04-13 RX ADMIN — AZITHROMYCIN 500 MILLIGRAM(S): 500 TABLET, FILM COATED ORAL at 10:00

## 2020-04-13 RX ADMIN — HEPARIN SODIUM 5000 UNIT(S): 5000 INJECTION INTRAVENOUS; SUBCUTANEOUS at 22:41

## 2020-04-13 RX ADMIN — CEFTRIAXONE 100 MILLIGRAM(S): 500 INJECTION, POWDER, FOR SOLUTION INTRAMUSCULAR; INTRAVENOUS at 17:39

## 2020-04-13 RX ADMIN — Medication 1 MILLIGRAM(S): at 10:00

## 2020-04-13 RX ADMIN — PROGESTERONE 200 MILLIGRAM(S): 200 CAPSULE, LIQUID FILLED ORAL at 22:41

## 2020-04-13 RX ADMIN — HEPARIN SODIUM 5000 UNIT(S): 5000 INJECTION INTRAVENOUS; SUBCUTANEOUS at 06:25

## 2020-04-13 NOTE — PROGRESS NOTE ADULT - ATTENDING COMMENTS
agree with above  pt seen and evaluated  briefly, 23 yo  at 30+3 w/ suspected COVID-19 pneumonia, currently stable  maintaining O2 Sat on RA. CTA ruled out PE, per ID- no therapy or abx at this time   EKG sinus tach w/ nonspecific T wave changes. monitor maternal tachycardia  mild thrombocytopenia and mild transaminitis, appears stable, elevated /57 x1, low suspicion for PEC.  lab abnl likely due to COVID-19 pneumonia.  f/u sx, BPs, repeat labs, p:cr ratio and monitor closely  Fetal status reassuring overall, no indication for BMZ at this time. defer MFM sono at this time.  SQH for DVT prophylaxis
I saw and counseled Ms. Choudhury this morning. Agree with above with the exception of the modifications in this note.   She denies loss of vaginal fluid, vaginal bleeding, or contractions. She reports active fetal movement.   She reports shortness of breath with ambulation, denies at rest. No cough, palpitations or chest pain.   She has a fever overnight, received acetaminophen, resolved with persistent mild tachycardia.   Fetal status is reassuring.   Will send blood and urine cultures.   Discussed her case and plan of care with ID service, will start azithromycin and ceftriaxone for presumed CAP given COVID has been negative twice.   Continue inpatient observation and supportive care.  DVT prophylaxis.     All questions answered.   Allie Sharma MD
I saw and counseled Ms. Choudhury this morning. Agree with above with the exception of the modifications in this note.   She denies loss of vaginal fluid, vaginal bleeding, or contractions. She reports active fetal movement.   She reports shortness of breath with ambulation, denies at rest. No cough, palpitations or chest pain.   She has a fever overnight, received acetaminophen, resolved with persistent mild tachycardia.   Fetal status is reassuring.   Will send blood and urine cultures.   Discussed her case and plan of care with ID service, will start azithromycin and ceftriaxone for presumed CAP given COVID has been negative twice.   Continue inpatient observation and supportive care.  DVT prophylaxis.     All questions answered.   Allie Sharma MD
PAtient seen and evaluated.  No more complains of leg pain  Still has shortness of breath  FHR tracing has been category I  Will get D Dimer and doppler lower extremity  MAy consider discharge if cleared by medical team

## 2020-04-13 NOTE — PROGRESS NOTE ADULT - PROBLEM SELECTOR PLAN 1
-CTA neg for PE. EKG Sinus tachy.  Pt tachycardic throughout admission.  -Appreciate ID recommendations: no need to start HCQ  -Repeat COVID PCR obtained 4/11- negative x 2  -azithromycin and ceftriaxone (4/11- ) due to presumed community acquired pneumonia as COVID swab negative x2  -po Tylenol for fever.  -NST qd  -Reg diet, SLIV  -HSQ for vte ppx    L Silvestre STAPLETON PGY3

## 2020-04-13 NOTE — PROGRESS NOTE ADULT - ASSESSMENT
24y  at 30w6d a/w SOB, tachycardia and concern for COVID-19 with negative swab x2, now being treated for CAP.  Pt is stable and afebrile overnight.  Last fever / @ 2pm.  Pt with soreness in R outer thigh, will consider LE dopplers if persists after ambulating.

## 2020-04-13 NOTE — PROGRESS NOTE ADULT - SUBJECTIVE AND OBJECTIVE BOX
CC: F/U for ? COVID    Saw/spoke to patient. Fevers. No chest pain, minimal cough. No N/V/D. No other new complaints.    Allergies  aspirin (Short breath)    ANTIMICROBIALS:  azithromycin   Tablet 500 daily  cefTRIAXone   IVPB 1000 every 24 hours    PE:    Vital Signs Last 24 Hrs  T(C): 36.8 (13 Apr 2020 10:31), Max: 38.4 (12 Apr 2020 14:22)  T(F): 98.2 (13 Apr 2020 10:31), Max: 101.2 (12 Apr 2020 14:22)  HR: 115 (13 Apr 2020 10:31) (92 - 244)  BP: 111/75 (13 Apr 2020 10:31) (106/58 - 121/79)  RR: 20 (13 Apr 2020 10:31) (20 - 24)  SpO2: 97% (13 Apr 2020 10:31) (86% - 100%)    Gen: AOx3, NAD, non-toxic  CV: S1+S2 normal, tachycardic  Resp: Clear bilat, no resp distress, no crackles/wheezes  Abd: Soft, nontender, +BS  Ext: No LE edema, no wounds    LABS:                        12.2   9.91  )-----------( 122      ( 13 Apr 2020 05:31 )             35.8     04-13    133<L>  |  101  |  3<L>  ----------------------------<  113<H>  3.5   |  18<L>  |  0.53    Ca    8.4      13 Apr 2020 05:31    TPro  5.8<L>  /  Alb  2.8<L>  /  TBili  0.5  /  DBili  x   /  AST  177<H>  /  ALT  103<H>  /  AlkPhos  151<H>  04-13    MICROBIOLOGY:    .Blood Blood-Venous  04-11-20   No growth to date.     .Urine Clean Catch (Midstream)  04-11-20   <10,000 CFU/mL Normal Urogenital Marly     RADIOLOGY:    4/9 CT:    IMPRESSION:     1.  No pulmonary emboli.  2.  The patchy foci of consolidation can occur in the setting of infection, including atypical causes of infection (Covid 19) given the current clinical environment.

## 2020-04-13 NOTE — PROGRESS NOTE ADULT - ASSESSMENT
23 yo  at 30+2 wks gestation (LISSETT 20) presents with worsening SOB, and sharp chest CP x3 days  Fever, no leukocytosis  CT chest with vague mild ground glass opacities  Elevated LFTs  Generally mild illness, but in pregnant patient  Overall,  1) Suspected COVID  - PCR negative times two- covid still high suspicion  - Check RVP  - O2 supplementation per primary team  - Hold on plaquenil/covid directed medications presently (patient saturating well on RA)  - Would not check further covid pcr, would presume underlying etiology is COVID for now  2) Fever  - Ceftriaxone/azithro for 5 day course    Jimmy Martin MD  Pager [phone]  After 5pm and on weekends call [phone]

## 2020-04-14 ENCOUNTER — APPOINTMENT (OUTPATIENT)
Dept: ANTEPARTUM | Facility: HOSPITAL | Age: 25
End: 2020-04-14

## 2020-04-14 VITALS
SYSTOLIC BLOOD PRESSURE: 114 MMHG | OXYGEN SATURATION: 98 % | RESPIRATION RATE: 18 BRPM | DIASTOLIC BLOOD PRESSURE: 74 MMHG | TEMPERATURE: 98 F | HEART RATE: 103 BPM

## 2020-04-14 PROBLEM — J45.909 UNSPECIFIED ASTHMA, UNCOMPLICATED: Chronic | Status: ACTIVE | Noted: 2020-04-09

## 2020-04-14 LAB
ALBUMIN SERPL ELPH-MCNC: 2.9 G/DL — LOW (ref 3.3–5)
ALP SERPL-CCNC: 170 U/L — HIGH (ref 40–120)
ALT FLD-CCNC: 77 U/L — HIGH (ref 4–33)
ANION GAP SERPL CALC-SCNC: 11 MMO/L — SIGNIFICANT CHANGE UP (ref 7–14)
APTT BLD: 29.8 SEC — SIGNIFICANT CHANGE UP (ref 27.5–36.3)
AST SERPL-CCNC: 121 U/L — HIGH (ref 4–32)
BASOPHILS # BLD AUTO: 0.04 K/UL — SIGNIFICANT CHANGE UP (ref 0–0.2)
BASOPHILS NFR BLD AUTO: 0.5 % — SIGNIFICANT CHANGE UP (ref 0–2)
BILIRUB SERPL-MCNC: 1 MG/DL — SIGNIFICANT CHANGE UP (ref 0.2–1.2)
BUN SERPL-MCNC: 2 MG/DL — LOW (ref 7–23)
CALCIUM SERPL-MCNC: 8.6 MG/DL — SIGNIFICANT CHANGE UP (ref 8.4–10.5)
CHLORIDE SERPL-SCNC: 103 MMOL/L — SIGNIFICANT CHANGE UP (ref 98–107)
CO2 SERPL-SCNC: 22 MMOL/L — SIGNIFICANT CHANGE UP (ref 22–31)
CREAT SERPL-MCNC: 0.47 MG/DL — LOW (ref 0.5–1.3)
EOSINOPHIL # BLD AUTO: 0.01 K/UL — SIGNIFICANT CHANGE UP (ref 0–0.5)
EOSINOPHIL NFR BLD AUTO: 0.1 % — SIGNIFICANT CHANGE UP (ref 0–6)
FERRITIN SERPL-MCNC: 119.8 NG/ML — SIGNIFICANT CHANGE UP (ref 15–150)
FIBRINOGEN PPP-MCNC: 569 MG/DL — HIGH (ref 300–520)
GLUCOSE SERPL-MCNC: 90 MG/DL — SIGNIFICANT CHANGE UP (ref 70–99)
HCT VFR BLD CALC: 36.5 % — SIGNIFICANT CHANGE UP (ref 34.5–45)
HGB BLD-MCNC: 12.3 G/DL — SIGNIFICANT CHANGE UP (ref 11.5–15.5)
IMM GRANULOCYTES NFR BLD AUTO: 16.5 % — HIGH (ref 0–1.5)
INR BLD: 0.9 — SIGNIFICANT CHANGE UP (ref 0.88–1.17)
LDH SERPL L TO P-CCNC: 278 U/L — HIGH (ref 135–225)
LYMPHOCYTES # BLD AUTO: 1.88 K/UL — SIGNIFICANT CHANGE UP (ref 1–3.3)
LYMPHOCYTES # BLD AUTO: 24.6 % — SIGNIFICANT CHANGE UP (ref 13–44)
MANUAL SMEAR VERIFICATION: SIGNIFICANT CHANGE UP
MCHC RBC-ENTMCNC: 31.5 PG — SIGNIFICANT CHANGE UP (ref 27–34)
MCHC RBC-ENTMCNC: 33.7 % — SIGNIFICANT CHANGE UP (ref 32–36)
MCV RBC AUTO: 93.4 FL — SIGNIFICANT CHANGE UP (ref 80–100)
MONOCYTES # BLD AUTO: 0.52 K/UL — SIGNIFICANT CHANGE UP (ref 0–0.9)
MONOCYTES NFR BLD AUTO: 6.8 % — SIGNIFICANT CHANGE UP (ref 2–14)
NEUTROPHILS # BLD AUTO: 3.93 K/UL — SIGNIFICANT CHANGE UP (ref 1.8–7.4)
NEUTROPHILS NFR BLD AUTO: 51.5 % — SIGNIFICANT CHANGE UP (ref 43–77)
NRBC # FLD: 0 K/UL — SIGNIFICANT CHANGE UP (ref 0–0)
PLATELET # BLD AUTO: 144 K/UL — LOW (ref 150–400)
PMV BLD: 11 FL — SIGNIFICANT CHANGE UP (ref 7–13)
POTASSIUM SERPL-MCNC: 4.4 MMOL/L — SIGNIFICANT CHANGE UP (ref 3.5–5.3)
POTASSIUM SERPL-SCNC: 4.4 MMOL/L — SIGNIFICANT CHANGE UP (ref 3.5–5.3)
PROCALCITONIN SERPL-MCNC: 0.8 NG/ML — HIGH (ref 0.02–0.1)
PROT SERPL-MCNC: 6 G/DL — SIGNIFICANT CHANGE UP (ref 6–8.3)
PROTHROM AB SERPL-ACNC: 10.2 SEC — SIGNIFICANT CHANGE UP (ref 9.8–13.1)
RBC # BLD: 3.91 M/UL — SIGNIFICANT CHANGE UP (ref 3.8–5.2)
RBC # FLD: 13.1 % — SIGNIFICANT CHANGE UP (ref 10.3–14.5)
SODIUM SERPL-SCNC: 136 MMOL/L — SIGNIFICANT CHANGE UP (ref 135–145)
URATE SERPL-MCNC: 2 MG/DL — LOW (ref 2.5–7)
WBC # BLD: 7.64 K/UL — SIGNIFICANT CHANGE UP (ref 3.8–10.5)
WBC # FLD AUTO: 7.64 K/UL — SIGNIFICANT CHANGE UP (ref 3.8–10.5)

## 2020-04-14 PROCEDURE — 99232 SBSQ HOSP IP/OBS MODERATE 35: CPT | Mod: GC

## 2020-04-14 PROCEDURE — 99232 SBSQ HOSP IP/OBS MODERATE 35: CPT

## 2020-04-14 RX ORDER — CEFUROXIME AXETIL 250 MG
1 TABLET ORAL
Qty: 5 | Refills: 0
Start: 2020-04-14 | End: 2020-04-15

## 2020-04-14 RX ORDER — AZITHROMYCIN 500 MG/1
1 TABLET, FILM COATED ORAL
Qty: 2 | Refills: 0
Start: 2020-04-14 | End: 2020-04-15

## 2020-04-14 RX ORDER — CEFUROXIME AXETIL 250 MG
500 TABLET ORAL EVERY 12 HOURS
Refills: 0 | Status: DISCONTINUED | OUTPATIENT
Start: 2020-04-14 | End: 2020-04-14

## 2020-04-14 RX ADMIN — Medication 500 MILLIGRAM(S): at 10:47

## 2020-04-14 RX ADMIN — Medication 1 MILLIGRAM(S): at 10:47

## 2020-04-14 RX ADMIN — AZITHROMYCIN 500 MILLIGRAM(S): 500 TABLET, FILM COATED ORAL at 10:47

## 2020-04-14 RX ADMIN — Medication 1 TABLET(S): at 10:47

## 2020-04-14 NOTE — PROGRESS NOTE ADULT - ASSESSMENT
A/P 23 yo  at 31w0d admitted for observation in the setting of suspected COVID-19 pneumonia, stable  - patient symptoms improved, LFTs increased but now downtrending, will discuss w/ ID and medicine and plan for d/c home today, will continue abx as outpatient  - no OB complaints, f/u w/ ACP in [facility] advised  - precautions reviewed

## 2020-04-14 NOTE — PROGRESS NOTE ADULT - ASSESSMENT
24y  at 31w0d a/w SOB, tachycardia and concern for COVID-19 with negative swab x2, now being treated for CAP.  Pt is stable and afebrile overnight.  Last fever  @ 2pm.

## 2020-04-14 NOTE — PROGRESS NOTE ADULT - SUBJECTIVE AND OBJECTIVE BOX
R3 ANTEPARTUM PROGRESS NOTE    HD#6    SUBJECTIVE:  Patient seen and examined at bedside, no acute overnight events. No acute complaints, denies pain.  Denies vb, lof, ctx.  Reports good FM.    Patient is ambulating, passing flatus, voiding spontaneously, and tolerating regular diet. Denies CP, SOB, N/V, fevers, and chills.    OBJECTIVE:  Vital Signs Last 24 Hours  T(C): 36.7 (04-14-20 @ 06:13), Max: 36.9 (04-13-20 @ 22:36)  HR: 101 (04-14-20 @ 06:13) (101 - 125)  BP: 108/71 (04-14-20 @ 06:13) (108/71 - 114/69)  RR: 17 (04-14-20 @ 06:13) (17 - 20)  SpO2: 97% (04-14-20 @ 06:13) (94% - 99%)    Physical Exam:  General: NAD  Abdomen: Soft, non-tender,   Ext: No pain or swelling    Labs:                        12.2   9.91  )-----------( 122      ( 13 Apr 2020 05:31 )             35.8   baso 0.6    eos 0.1    imm gran 9.4    lymph 14.4   mono 3.8    poly 71.7     04-13    133<L>  |  101  |  3<L>  ----------------------------<  113<H>  3.5   |  18<L>  |  0.53    Ca    8.4      13 Apr 2020 05:31    TPro  5.8<L>  /  Alb  2.8<L>  /  TBili  0.5  /  DBili  x   /  AST  177<H>  /  ALT  103<H>  /  AlkPhos  151<H>  04-13    PT/INR - ( 13 Apr 2020 05:31 )   PT: 11.0 SEC;   INR: 0.97          PTT - ( 13 Apr 2020 05:31 )  PTT:35.9 SEC      Blood Type: O Positive      MEDICATIONS  (STANDING):  azithromycin   Tablet 500 milliGRAM(s) Oral daily  cefTRIAXone   IVPB 1000 milliGRAM(s) IV Intermittent every 24 hours  folic acid 1 milliGRAM(s) Oral daily  heparin  Injectable 5000 Unit(s) SubCutaneous every 12 hours  prenatal multivitamin 1 Tablet(s) Oral daily  progesterone Vaginal Insert 200 milliGRAM(s) Vaginal daily  senna 2 Tablet(s) Oral at bedtime    MEDICATIONS  (PRN):  acetaminophen   Tablet .. 975 milliGRAM(s) Oral every 6 hours PRN Temp greater or equal to 38C (100.4F)

## 2020-04-14 NOTE — PROGRESS NOTE ADULT - ASSESSMENT
25 yo  at 30+2 wks gestation (LISSETT 20) presents with worsening SOB, and sharp chest CP x3 days  Fever, no leukocytosis  CT chest with vague mild ground glass opacities  Elevated LFTs  Generally mild illness, but in pregnant patient  RVP neg  Overall,  1) Suspected COVID  - PCR negative times two- covid still high suspicion  - O2 supplementation per primary team  - Hold on plaquenil/covid directed medications presently (patient saturating well on RA)  - Would not check further covid pcr, would presume underlying etiology is COVID for now  2) Fever  - Ceftriaxone/azithro for 5 day course    Instructed patient to self isolate at home, come back to hospital/call MD if SOB, other concerning symptoms; concern regarding baby and possible COVID per RUMA Martin MD  Pager [phone]  After 5pm and on weekends call [phone]

## 2020-04-14 NOTE — PROGRESS NOTE ADULT - SUBJECTIVE AND OBJECTIVE BOX
CC: F/U for PNA    Saw/spoke to patient. Patient well. No new complaints. No symptoms.    Allergies  aspirin (Short breath)    ANTIMICROBIALS:  azithromycin   Tablet 500 daily  cefuroxime   Tablet 500 every 12 hours    PE:    Vital Signs Last 24 Hrs  T(C): 36.6 (14 Apr 2020 10:44), Max: 36.9 (13 Apr 2020 22:36)  T(F): 97.9 (14 Apr 2020 10:44), Max: 98.4 (13 Apr 2020 22:36)  HR: 103 (14 Apr 2020 10:44) (101 - 120)  BP: 114/74 (14 Apr 2020 10:44) (108/71 - 114/74)  RR: 18 (14 Apr 2020 10:44) (17 - 20)  SpO2: 98% (14 Apr 2020 10:44) (96% - 98%)    Gen: AOx3, NAD, non-toxic  CV: S1+S2 normal, nontachycardic  Resp: Clear bilat, no resp distress, no crackles/wheezes  Abd: Soft, nontender, +BS  Ext: No LE edema, no wounds    LABS:                        12.3   7.64  )-----------( 144      ( 14 Apr 2020 07:00 )             36.5     04-14    136  |  103  |  2<L>  ----------------------------<  90  4.4   |  22  |  0.47<L>    Ca    8.6      14 Apr 2020 07:00    TPro  6.0  /  Alb  2.9<L>  /  TBili  1.0  /  DBili  x   /  AST  121<H>  /  ALT  77<H>  /  AlkPhos  170<H>  04-14    MICROBIOLOGY:    .Blood Blood-Venous  04-11-20   No growth to date.    .Urine Clean Catch (Midstream)  04-11-20   <10,000 CFU/mL Normal Urogenital Marly    RADIOLOGY:    4/9 CT:    IMPRESSION:     1.  No pulmonary emboli.  2.  The patchy foci of consolidation can occur in the setting of infection, including atypical causes of infection (Covid 19) given the current clinical environment.

## 2020-04-15 LAB
CULTURE RESULTS: SIGNIFICANT CHANGE UP
SPECIMEN SOURCE: SIGNIFICANT CHANGE UP

## 2020-04-16 LAB
CULTURE RESULTS: SIGNIFICANT CHANGE UP
CULTURE RESULTS: SIGNIFICANT CHANGE UP
SPECIMEN SOURCE: SIGNIFICANT CHANGE UP
SPECIMEN SOURCE: SIGNIFICANT CHANGE UP

## 2020-04-30 ENCOUNTER — APPOINTMENT (OUTPATIENT)
Dept: ANTEPARTUM | Facility: CLINIC | Age: 25
End: 2020-04-30
Payer: MEDICAID

## 2020-04-30 PROCEDURE — 76819 FETAL BIOPHYS PROFIL W/O NST: CPT

## 2020-04-30 PROCEDURE — 76817 TRANSVAGINAL US OBSTETRIC: CPT

## 2020-04-30 PROCEDURE — 76816 OB US FOLLOW-UP PER FETUS: CPT

## 2020-05-12 NOTE — DISCHARGE NOTE ANTEPARTUM - NS AS DC FOLLOWUP INST YN
Reason for call:  Form  Reason for Call:  Form, our goal is to have forms completed with 72 hours, however, some forms may require a visit or additional information.    Type of letter, form or note:  medical    Who is the form from?:  [facility] [employer]    Where did the form come from: form was faxed in    What clinic location was the form placed at?: [facility] - [phone]    Where the form was placed: 's Box    What number is listed as a contact on the form?:  [phone]       Additional comments:  Fax to [phone]    created by Kelli Reza       Yes

## 2020-05-18 ENCOUNTER — APPOINTMENT (OUTPATIENT)
Dept: ANTEPARTUM | Facility: CLINIC | Age: 25
End: 2020-05-18
Payer: MEDICAID

## 2020-05-18 PROCEDURE — 76819 FETAL BIOPHYS PROFIL W/O NST: CPT

## 2020-05-18 PROCEDURE — 76816 OB US FOLLOW-UP PER FETUS: CPT

## 2020-06-08 ENCOUNTER — APPOINTMENT (OUTPATIENT)
Dept: ANTEPARTUM | Facility: CLINIC | Age: 25
End: 2020-06-08
Payer: MEDICAID

## 2020-06-08 PROCEDURE — 76816 OB US FOLLOW-UP PER FETUS: CPT

## 2020-06-08 PROCEDURE — 76819 FETAL BIOPHYS PROFIL W/O NST: CPT

## 2020-06-15 ENCOUNTER — INPATIENT (INPATIENT)
Facility: HOSPITAL | Age: 25
LOS: 1 days | Discharge: HOME | End: 2020-06-17
Attending: OBSTETRICS & GYNECOLOGY | Admitting: OBSTETRICS & GYNECOLOGY
Payer: MEDICAID

## 2020-06-15 VITALS — HEIGHT: 64 IN | WEIGHT: 224.87 LBS

## 2020-06-15 DIAGNOSIS — O26.899 OTHER SPECIFIED PREGNANCY RELATED CONDITIONS, UNSPECIFIED TRIMESTER: ICD-10-CM

## 2020-06-15 DIAGNOSIS — Z98.890 OTHER SPECIFIED POSTPROCEDURAL STATES: Chronic | ICD-10-CM

## 2020-06-15 DIAGNOSIS — Z3A.00 WEEKS OF GESTATION OF PREGNANCY NOT SPECIFIED: ICD-10-CM

## 2020-06-15 LAB
BASOPHILS # BLD AUTO: 0.01 K/UL — SIGNIFICANT CHANGE UP (ref 0–0.2)
BASOPHILS NFR BLD AUTO: 0.1 % — SIGNIFICANT CHANGE UP (ref 0–2)
EOSINOPHIL # BLD AUTO: 0.03 K/UL — SIGNIFICANT CHANGE UP (ref 0–0.5)
EOSINOPHIL NFR BLD AUTO: 0.3 % — SIGNIFICANT CHANGE UP (ref 0–6)
HCT VFR BLD CALC: 35.6 % — SIGNIFICANT CHANGE UP (ref 34.5–45)
HGB BLD-MCNC: 12.2 G/DL — SIGNIFICANT CHANGE UP (ref 11.5–15.5)
IMM GRANULOCYTES NFR BLD AUTO: 0.4 % — SIGNIFICANT CHANGE UP (ref 0–1.5)
LYMPHOCYTES # BLD AUTO: 1.97 K/UL — SIGNIFICANT CHANGE UP (ref 1–3.3)
LYMPHOCYTES # BLD AUTO: 16.6 % — SIGNIFICANT CHANGE UP (ref 13–44)
MCHC RBC-ENTMCNC: 32.4 PG — SIGNIFICANT CHANGE UP (ref 27–34)
MCHC RBC-ENTMCNC: 34.3 GM/DL — SIGNIFICANT CHANGE UP (ref 32–36)
MCV RBC AUTO: 94.4 FL — SIGNIFICANT CHANGE UP (ref 80–100)
MONOCYTES # BLD AUTO: 0.55 K/UL — SIGNIFICANT CHANGE UP (ref 0–0.9)
MONOCYTES NFR BLD AUTO: 4.6 % — SIGNIFICANT CHANGE UP (ref 2–14)
NEUTROPHILS # BLD AUTO: 9.27 K/UL — HIGH (ref 1.8–7.4)
NEUTROPHILS NFR BLD AUTO: 78 % — HIGH (ref 43–77)
NRBC # BLD: 0 /100 WBCS — SIGNIFICANT CHANGE UP (ref 0–0)
PLATELET # BLD AUTO: 155 K/UL — SIGNIFICANT CHANGE UP (ref 150–400)
RBC # BLD: 3.77 M/UL — LOW (ref 3.8–5.2)
RBC # FLD: 13.4 % — SIGNIFICANT CHANGE UP (ref 10.3–14.5)
SARS-COV-2 RNA SPEC QL NAA+PROBE: SIGNIFICANT CHANGE UP
WBC # BLD: 11.88 K/UL — HIGH (ref 3.8–10.5)
WBC # FLD AUTO: 11.88 K/UL — HIGH (ref 3.8–10.5)

## 2020-06-15 RX ORDER — SODIUM CHLORIDE 9 MG/ML
1000 INJECTION, SOLUTION INTRAVENOUS
Refills: 0 | Status: DISCONTINUED | OUTPATIENT
Start: 2020-06-15 | End: 2020-06-16

## 2020-06-15 RX ORDER — OXYTOCIN 10 UNIT/ML
333.33 VIAL (ML) INJECTION
Qty: 20 | Refills: 0 | Status: DISCONTINUED | OUTPATIENT
Start: 2020-06-15 | End: 2020-06-16

## 2020-06-15 RX ORDER — CITRIC ACID/SODIUM CITRATE 300-500 MG
15 SOLUTION, ORAL ORAL ONCE
Refills: 0 | Status: DISCONTINUED | OUTPATIENT
Start: 2020-06-15 | End: 2020-06-16

## 2020-06-16 LAB
RH IG SCN BLD-IMP: POSITIVE — SIGNIFICANT CHANGE UP
T PALLIDUM AB TITR SER: NEGATIVE — SIGNIFICANT CHANGE UP

## 2020-06-16 RX ORDER — PRAMOXINE HYDROCHLORIDE 150 MG/15G
1 AEROSOL, FOAM RECTAL EVERY 4 HOURS
Refills: 0 | Status: DISCONTINUED | OUTPATIENT
Start: 2020-06-16 | End: 2020-06-17

## 2020-06-16 RX ORDER — ACETAMINOPHEN 500 MG
975 TABLET ORAL
Refills: 0 | Status: DISCONTINUED | OUTPATIENT
Start: 2020-06-16 | End: 2020-06-17

## 2020-06-16 RX ORDER — LANOLIN
1 OINTMENT (GRAM) TOPICAL EVERY 6 HOURS
Refills: 0 | Status: DISCONTINUED | OUTPATIENT
Start: 2020-06-16 | End: 2020-06-17

## 2020-06-16 RX ORDER — AER TRAVELER 0.5 G/1
1 SOLUTION RECTAL; TOPICAL EVERY 4 HOURS
Refills: 0 | Status: DISCONTINUED | OUTPATIENT
Start: 2020-06-16 | End: 2020-06-17

## 2020-06-16 RX ORDER — TETANUS TOXOID, REDUCED DIPHTHERIA TOXOID AND ACELLULAR PERTUSSIS VACCINE, ADSORBED 5; 2.5; 8; 8; 2.5 [IU]/.5ML; [IU]/.5ML; UG/.5ML; UG/.5ML; UG/.5ML
0.5 SUSPENSION INTRAMUSCULAR ONCE
Refills: 0 | Status: DISCONTINUED | OUTPATIENT
Start: 2020-06-16 | End: 2020-06-17

## 2020-06-16 RX ORDER — DIBUCAINE 1 %
1 OINTMENT (GRAM) RECTAL EVERY 6 HOURS
Refills: 0 | Status: DISCONTINUED | OUTPATIENT
Start: 2020-06-16 | End: 2020-06-17

## 2020-06-16 RX ORDER — MAGNESIUM HYDROXIDE 400 MG/1
30 TABLET, CHEWABLE ORAL
Refills: 0 | Status: DISCONTINUED | OUTPATIENT
Start: 2020-06-16 | End: 2020-06-17

## 2020-06-16 RX ORDER — BENZOCAINE 10 %
1 GEL (GRAM) MUCOUS MEMBRANE EVERY 6 HOURS
Refills: 0 | Status: DISCONTINUED | OUTPATIENT
Start: 2020-06-16 | End: 2020-06-17

## 2020-06-16 RX ORDER — FENTANYL/BUPIVACAINE/NS/PF 2MCG/ML-.1
250 PLASTIC BAG, INJECTION (ML) INJECTION
Refills: 0 | Status: DISCONTINUED | OUTPATIENT
Start: 2020-06-16 | End: 2020-06-16

## 2020-06-16 RX ORDER — OXYTOCIN 10 UNIT/ML
333.33 VIAL (ML) INJECTION
Qty: 20 | Refills: 0 | Status: DISCONTINUED | OUTPATIENT
Start: 2020-06-16 | End: 2020-06-17

## 2020-06-16 RX ORDER — DIPHENHYDRAMINE HCL 50 MG
25 CAPSULE ORAL EVERY 6 HOURS
Refills: 0 | Status: DISCONTINUED | OUTPATIENT
Start: 2020-06-16 | End: 2020-06-17

## 2020-06-16 RX ORDER — SODIUM CHLORIDE 9 MG/ML
3 INJECTION INTRAMUSCULAR; INTRAVENOUS; SUBCUTANEOUS EVERY 8 HOURS
Refills: 0 | Status: DISCONTINUED | OUTPATIENT
Start: 2020-06-16 | End: 2020-06-17

## 2020-06-16 RX ORDER — HYDROCORTISONE 1 %
1 OINTMENT (GRAM) TOPICAL EVERY 6 HOURS
Refills: 0 | Status: DISCONTINUED | OUTPATIENT
Start: 2020-06-16 | End: 2020-06-17

## 2020-06-16 RX ORDER — OXYCODONE HYDROCHLORIDE 5 MG/1
5 TABLET ORAL
Refills: 0 | Status: DISCONTINUED | OUTPATIENT
Start: 2020-06-16 | End: 2020-06-17

## 2020-06-16 RX ORDER — OXYCODONE HYDROCHLORIDE 5 MG/1
5 TABLET ORAL ONCE
Refills: 0 | Status: DISCONTINUED | OUTPATIENT
Start: 2020-06-16 | End: 2020-06-17

## 2020-06-16 RX ORDER — SIMETHICONE 80 MG/1
80 TABLET, CHEWABLE ORAL EVERY 4 HOURS
Refills: 0 | Status: DISCONTINUED | OUTPATIENT
Start: 2020-06-16 | End: 2020-06-17

## 2020-06-16 RX ADMIN — SODIUM CHLORIDE 3 MILLILITER(S): 9 INJECTION INTRAMUSCULAR; INTRAVENOUS; SUBCUTANEOUS at 12:11

## 2020-06-16 RX ADMIN — SODIUM CHLORIDE 3 MILLILITER(S): 9 INJECTION INTRAMUSCULAR; INTRAVENOUS; SUBCUTANEOUS at 06:30

## 2020-06-16 RX ADMIN — Medication 1 TABLET(S): at 12:53

## 2020-06-16 RX ADMIN — Medication 975 MILLIGRAM(S): at 20:46

## 2020-06-16 RX ADMIN — SODIUM CHLORIDE 3 MILLILITER(S): 9 INJECTION INTRAMUSCULAR; INTRAVENOUS; SUBCUTANEOUS at 21:19

## 2020-06-16 RX ADMIN — SODIUM CHLORIDE 125 MILLILITER(S): 9 INJECTION, SOLUTION INTRAVENOUS at 01:33

## 2020-06-16 NOTE — LACTATION INITIAL EVALUATION - NS LACT CON REASON FOR REQ
primaparous mom/40 wk gestation baby boy, seen around 9 hrs of life. Baby mucousy upon visit to room. Sat baby up offered burps and placed skin to skin on mothers chest. Baby also sleepy at this time. Mother reports baby fed well on the right breast x1 since birth, she would like help latching the baby to her left breast. Mother with short nipples but compressible areolar tissue. Positioning and latching strategies were taught for football hold, but baby not interested in feeding and refused to latch. Complete breastfeeding education was provided to mom and she verbalized understanding of info given. Manual expression technique was taught to the mother with proper return demo. Colostrum expressible and drops placed on babies lips. Mother to remain skin to skin with baby, offering drops of colostrum as shown, and reattempt latch when baby showing early feeding cues. She understands she may call me back in for assistance as needed. All questions were answered, mother verbalized understanding of teaching and info given.

## 2020-06-17 ENCOUNTER — TRANSCRIPTION ENCOUNTER (OUTPATIENT)
Age: 25
End: 2020-06-17

## 2020-06-17 VITALS
OXYGEN SATURATION: 99 % | DIASTOLIC BLOOD PRESSURE: 66 MMHG | TEMPERATURE: 98 F | HEART RATE: 77 BPM | RESPIRATION RATE: 17 BRPM | SYSTOLIC BLOOD PRESSURE: 100 MMHG

## 2020-06-17 PROCEDURE — 86780 TREPONEMA PALLIDUM: CPT

## 2020-06-17 PROCEDURE — 99214 OFFICE O/P EST MOD 30 MIN: CPT

## 2020-06-17 PROCEDURE — 36415 COLL VENOUS BLD VENIPUNCTURE: CPT

## 2020-06-17 PROCEDURE — 86850 RBC ANTIBODY SCREEN: CPT

## 2020-06-17 PROCEDURE — 85025 COMPLETE CBC W/AUTO DIFF WBC: CPT

## 2020-06-17 PROCEDURE — 82962 GLUCOSE BLOOD TEST: CPT

## 2020-06-17 PROCEDURE — 86901 BLOOD TYPING SEROLOGIC RH(D): CPT

## 2020-06-17 PROCEDURE — 87635 SARS-COV-2 COVID-19 AMP PRB: CPT

## 2020-06-17 RX ADMIN — Medication 975 MILLIGRAM(S): at 02:12

## 2020-06-17 RX ADMIN — SODIUM CHLORIDE 3 MILLILITER(S): 9 INJECTION INTRAMUSCULAR; INTRAVENOUS; SUBCUTANEOUS at 06:24

## 2020-06-17 NOTE — DISCHARGE NOTE OB - PLAN OF CARE
Healthy recovery Vaginal delivery, meeting all postpartum milestones. Pelvic rest until otherwise instructed. Follow up in 6 weeks.

## 2020-06-17 NOTE — DISCHARGE NOTE OB - CARE PLAN
Principal Discharge DX:	Postpartum state  Goal:	Healthy recovery  Assessment and plan of treatment:	Vaginal delivery, meeting all postpartum milestones. Pelvic rest until otherwise instructed. Follow up in 6 weeks.

## 2020-06-17 NOTE — PROGRESS NOTE ADULT - SUBJECTIVE AND OBJECTIVE BOX
Patient evaluated at bedside this morning, resting comfortable in bed, no acute events overnight.  She reports pain is well controlled with tylenol and motrin.  She denies headache, dizziness, chest pain, palpitations, shortness of breath, nausea, vomiting, heavy vaginal bleeding or perineal discomfort. Reports decrease in amount of vaginal bleeding and denies clots.  She has been ambulating without assistance, voiding spontaneously, and is breastfeeding.   Tolerating food well, without nausea/vomit.  Passing flatus.     Physical Exam:  T(C): 36.7 (06-17-20 @ 05:45), Max: 36.7 (06-17-20 @ 05:45)  HR: 71 (06-17-20 @ 05:45) (71 - 71)  BP: 104/69 (06-17-20 @ 05:45) (104/69 - 104/69)  RR: 18 (06-17-20 @ 05:45) (18 - 18)  SpO2: 99% (06-17-20 @ 05:45) (99% - 99%)    GA: NAD, A&O x 3  CV: RRR, no murmurs, rubs, or gallops  Pulm: clear breath sounds throughout, no rales, rhonchi, wheezes  Abd: + BS, soft, nontender, nondistended, no rebound or guarding, uterus firm at midline and below umbilicus  Extremities: no swelling or calf tenderness  Perineum: normal lochia, intact, healing well, no hematoma                          12.2   11.88 )-----------( 155      ( 15 Navneet 2020 18:15 )             35.6           acetaminophen   Tablet .. 975 milliGRAM(s) Oral <User Schedule>  benzocaine 20%/menthol 0.5% Spray 1 Spray(s) Topical every 6 hours PRN  dibucaine 1% Ointment 1 Application(s) Topical every 6 hours PRN  diphenhydrAMINE 25 milliGRAM(s) Oral every 6 hours PRN  diphtheria/tetanus/pertussis (acellular) Vaccine (ADAcel) 0.5 milliLiter(s) IntraMuscular once  hydrocortisone 1% Cream 1 Application(s) Topical every 6 hours PRN  lanolin Ointment 1 Application(s) Topical every 6 hours PRN  magnesium hydroxide Suspension 30 milliLiter(s) Oral two times a day PRN  oxyCODONE    IR 5 milliGRAM(s) Oral every 3 hours PRN  oxyCODONE    IR 5 milliGRAM(s) Oral once PRN  oxytocin Infusion 333.333 milliUNIT(s)/Min IV Continuous <Continuous>  pramoxine 1%/zinc 5% Cream 1 Application(s) Topical every 4 hours PRN  prenatal multivitamin 1 Tablet(s) Oral daily  simethicone 80 milliGRAM(s) Chew every 4 hours PRN  sodium chloride 0.9% lock flush 3 milliLiter(s) IV Push every 8 hours  witch hazel Pads 1 Application(s) Topical every 4 hours PRN

## 2020-06-17 NOTE — DISCHARGE NOTE OB - MATERIALS PROVIDED
Vaccinations/  Immunization Record/[facility]  Screening Program/Breastfeeding Log/Bottle Feeding Log

## 2020-06-17 NOTE — DISCHARGE NOTE OB - CARE PROVIDER_API CALL
Elena Hollingsworth)  Obstetrics and Gynecology  [address] [place] [address]  Phone: [phone]  Fax: [phone]  Follow Up Time:

## 2020-06-17 NOTE — PROGRESS NOTE ADULT - ASSESSMENT
A/P 24y s/p , PPD #1  , stable, meeting postpartum milestones   - Pain: well controlled on motrin and tylenol  - GI: Tolerating regular diet   - : urinating without difficulty/pain  - DVT prophylaxis: ambulating frequently  - Dispo: PPD 2, unless otherwise specified

## 2020-06-17 NOTE — DISCHARGE NOTE OB - PATIENT PORTAL LINK FT
You can access the FollowMyHealth Patient Portal offered by [facility] by registering at the following website: [email or web address]. By joining [facility]’s FollowMyHealth portal, you will also be able to view your health information using other applications (apps) compatible with our system.

## 2020-06-19 DIAGNOSIS — Z90.5 ACQUIRED ABSENCE OF KIDNEY: ICD-10-CM

## 2020-06-19 DIAGNOSIS — Z3A.40 40 WEEKS GESTATION OF PREGNANCY: ICD-10-CM

## 2020-06-19 DIAGNOSIS — J45.909 UNSPECIFIED ASTHMA, UNCOMPLICATED: ICD-10-CM

## 2020-06-19 DIAGNOSIS — O48.0 POST-TERM PREGNANCY: ICD-10-CM

## 2021-06-16 NOTE — ED PROVIDER NOTE - INPATIENT RESIDENT/ACP NOTIFIED DATE
Thalidomide Counseling: I discussed with the patient the risks of thalidomide including but not limited to birth defects, anxiety, weakness, chest pain, dizziness, cough and severe allergy. 09-Apr-2020 21:36

## 2022-01-13 NOTE — DISCHARGE NOTE OB - REST! DO NOT DO HEAVY HOUSEWORK, LIFTING OR STRENOUS EXERCISE FOR TWO WEEKS
Ese presents today for her OB visit.    Patient would like communication of their results via: [employer]    Patient's current [facility] status: Active.     Chaperone needed:  No    Baby Scripts - Patient is on [employer] Scheduling.          
Supervision of other normal pregnancy, antepartum   Anatomy US - , anterior placenta, no previa, LAST WNL, EFW 87th%; suboptimal heart views, other anatomy WNL, male, cervix normal.  Repeat OB anatomy US in 4 wk for heart views- scheduled with [facility].    Elevated hemoglobin A1c AND History of gestational diabetes mellitus (GDM)  Still has not reached out to endocrine re: elevated glycohemoglobin.  Discussed importance of doing so.  Also discussed ill effect of GDM on pregnancy.  Still has pelvic pain and low back pain.  PT referral order in place.  She still has not reached out.  Brochure given.    Encouraged to walk down to Dr Smith's office and schedule appointment on her way out.  Pt verbalizes understanding.    Anxiety and depression  Reports emotionally doing better on Sertraline 100 mg daily.  Is back at work and doing well.  Her appetite has returned.  She has gained back some of the weight she lost.      Return to clinic in 4 wk OB visit after M f/u anatomy US.                
Statement Selected

## 2022-11-08 ENCOUNTER — NON-APPOINTMENT (OUTPATIENT)
Age: 27
End: 2022-11-08

## 2023-07-25 ENCOUNTER — NON-APPOINTMENT (OUTPATIENT)
Age: 28
End: 2023-07-25

## 2023-09-26 ENCOUNTER — NON-APPOINTMENT (OUTPATIENT)
Age: 28
End: 2023-09-26

## 2024-12-24 ENCOUNTER — NON-APPOINTMENT (OUTPATIENT)
Age: 29
End: 2024-12-24

## 2025-01-11 ENCOUNTER — NON-APPOINTMENT (OUTPATIENT)
Age: 30
End: 2025-01-11

## 2025-01-23 NOTE — DISCHARGE NOTE OB - DO NOT DIET OR “STARVE” YOURSELF INTO GETTING BACK TO PRE-PREGNANCY SHAPE
Patients mom calling in to report fever that started today of 103.2F rectally around 6pm.  Fever is now down to 102.7F rectally after a dose of infant tylenol and a lukewarm bath.  Patients mom states big brother has been sick with a viral infection for the past few days and the patient started having a cough yesterday.  Mom states patient went to get 6 month vaccines today and after  grabbed a temperature on the patient because he was red and warm.  Mom did note patient slept longer than usual today and hasn't eaten since  but while on the phone patient was awake and active and mom was going to make a bottle soon.  Patient has had good wet diapers throughout the day.  Care advice for home care given to mom who was agreeable and told her to callback if patients symptoms worsened, temperature reached 105 or above, or patient seems unwell.     Statement Selected